# Patient Record
Sex: FEMALE | Race: WHITE | NOT HISPANIC OR LATINO | Employment: FULL TIME | URBAN - METROPOLITAN AREA
[De-identification: names, ages, dates, MRNs, and addresses within clinical notes are randomized per-mention and may not be internally consistent; named-entity substitution may affect disease eponyms.]

---

## 2019-02-10 ENCOUNTER — HOSPITAL ENCOUNTER (INPATIENT)
Facility: HOSPITAL | Age: 24
LOS: 1 days | Discharge: HOME | End: 2019-02-12
Attending: EMERGENCY MEDICINE | Admitting: HOSPITALIST

## 2019-02-10 DIAGNOSIS — R19.7 NAUSEA VOMITING AND DIARRHEA: Primary | ICD-10-CM

## 2019-02-10 DIAGNOSIS — E86.0 DEHYDRATION: ICD-10-CM

## 2019-02-10 DIAGNOSIS — R11.2 NAUSEA VOMITING AND DIARRHEA: Primary | ICD-10-CM

## 2019-02-10 DIAGNOSIS — D72.829 LEUKOCYTOSIS, UNSPECIFIED TYPE: ICD-10-CM

## 2019-02-10 DIAGNOSIS — E87.20 LACTIC ACIDOSIS: ICD-10-CM

## 2019-02-10 LAB
BASOPHILS # BLD AUTO: 0.02 10*3/MM3 (ref 0–0.2)
BASOPHILS NFR BLD AUTO: 0.1 % (ref 0–1.5)
D-LACTATE SERPL-SCNC: 4.4 MMOL/L (ref 0.5–2)
DEPRECATED RDW RBC AUTO: 40.3 FL (ref 37–54)
EOSINOPHIL # BLD AUTO: 0.01 10*3/MM3 (ref 0–0.7)
EOSINOPHIL NFR BLD AUTO: 0.1 % (ref 0.3–6.2)
ERYTHROCYTE [DISTWIDTH] IN BLOOD BY AUTOMATED COUNT: 12.5 % (ref 11.7–13)
HCG SERPL QL: NEGATIVE
HCT VFR BLD AUTO: 45.6 % (ref 35.6–45.5)
HGB BLD-MCNC: 15.6 G/DL (ref 11.9–15.5)
IMM GRANULOCYTES # BLD AUTO: 0.08 10*3/MM3 (ref 0–0.03)
IMM GRANULOCYTES NFR BLD AUTO: 0.4 % (ref 0–0.5)
LYMPHOCYTES # BLD AUTO: 1.44 10*3/MM3 (ref 0.9–4.8)
LYMPHOCYTES NFR BLD AUTO: 8 % (ref 19.6–45.3)
MAGNESIUM SERPL-MCNC: 2 MG/DL (ref 1.6–2.6)
MCH RBC QN AUTO: 30.8 PG (ref 26.9–32)
MCHC RBC AUTO-ENTMCNC: 34.2 G/DL (ref 32.4–36.3)
MCV RBC AUTO: 90.1 FL (ref 80.5–98.2)
MONOCYTES # BLD AUTO: 0.17 10*3/MM3 (ref 0.2–1.2)
MONOCYTES NFR BLD AUTO: 0.9 % (ref 5–12)
NEUTROPHILS # BLD AUTO: 16.37 10*3/MM3 (ref 1.9–8.1)
NEUTROPHILS NFR BLD AUTO: 90.5 % (ref 42.7–76)
PLATELET # BLD AUTO: 302 10*3/MM3 (ref 140–500)
PMV BLD AUTO: 10 FL (ref 6–12)
RBC # BLD AUTO: 5.06 10*6/MM3 (ref 3.9–5.2)
WBC NRBC COR # BLD: 18.09 10*3/MM3 (ref 4.5–10.7)

## 2019-02-10 PROCEDURE — 84703 CHORIONIC GONADOTROPIN ASSAY: CPT | Performed by: EMERGENCY MEDICINE

## 2019-02-10 PROCEDURE — 83735 ASSAY OF MAGNESIUM: CPT | Performed by: EMERGENCY MEDICINE

## 2019-02-10 PROCEDURE — 25010000002 ONDANSETRON PER 1 MG: Performed by: EMERGENCY MEDICINE

## 2019-02-10 PROCEDURE — 25010000002 HYDROMORPHONE PER 4 MG: Performed by: EMERGENCY MEDICINE

## 2019-02-10 PROCEDURE — 36415 COLL VENOUS BLD VENIPUNCTURE: CPT

## 2019-02-10 PROCEDURE — 84145 PROCALCITONIN (PCT): CPT | Performed by: EMERGENCY MEDICINE

## 2019-02-10 PROCEDURE — 99285 EMERGENCY DEPT VISIT HI MDM: CPT

## 2019-02-10 PROCEDURE — 85025 COMPLETE CBC W/AUTO DIFF WBC: CPT | Performed by: EMERGENCY MEDICINE

## 2019-02-10 PROCEDURE — 80053 COMPREHEN METABOLIC PANEL: CPT | Performed by: EMERGENCY MEDICINE

## 2019-02-10 PROCEDURE — 83605 ASSAY OF LACTIC ACID: CPT | Performed by: EMERGENCY MEDICINE

## 2019-02-10 RX ORDER — SODIUM CHLORIDE 9 MG/ML
125 INJECTION, SOLUTION INTRAVENOUS CONTINUOUS
Status: DISCONTINUED | OUTPATIENT
Start: 2019-02-10 | End: 2019-02-11

## 2019-02-10 RX ORDER — HYDROMORPHONE HYDROCHLORIDE 1 MG/ML
0.5 INJECTION, SOLUTION INTRAMUSCULAR; INTRAVENOUS; SUBCUTANEOUS ONCE
Status: COMPLETED | OUTPATIENT
Start: 2019-02-10 | End: 2019-02-10

## 2019-02-10 RX ORDER — ONDANSETRON 2 MG/ML
4 INJECTION INTRAMUSCULAR; INTRAVENOUS ONCE
Status: COMPLETED | OUTPATIENT
Start: 2019-02-10 | End: 2019-02-10

## 2019-02-10 RX ADMIN — SODIUM CHLORIDE 1775 ML: 9 INJECTION, SOLUTION INTRAVENOUS at 22:24

## 2019-02-10 RX ADMIN — SODIUM CHLORIDE 1000 ML: 9 INJECTION, SOLUTION INTRAVENOUS at 21:39

## 2019-02-10 RX ADMIN — HYDROMORPHONE HYDROCHLORIDE 0.5 MG: 1 INJECTION, SOLUTION INTRAMUSCULAR; INTRAVENOUS; SUBCUTANEOUS at 21:50

## 2019-02-10 RX ADMIN — ONDANSETRON 4 MG: 2 INJECTION INTRAMUSCULAR; INTRAVENOUS at 21:39

## 2019-02-11 ENCOUNTER — APPOINTMENT (OUTPATIENT)
Dept: CT IMAGING | Facility: HOSPITAL | Age: 24
End: 2019-02-11

## 2019-02-11 PROBLEM — K52.9 GASTROENTERITIS: Status: ACTIVE | Noted: 2019-02-11

## 2019-02-11 PROBLEM — R11.2 NAUSEA VOMITING AND DIARRHEA: Status: ACTIVE | Noted: 2019-02-11

## 2019-02-11 PROBLEM — R19.7 NAUSEA VOMITING AND DIARRHEA: Status: ACTIVE | Noted: 2019-02-11

## 2019-02-11 PROBLEM — E87.29 HIGH ANION GAP METABOLIC ACIDOSIS: Status: ACTIVE | Noted: 2019-02-11

## 2019-02-11 PROBLEM — D72.829 LEUKOCYTOSIS: Status: ACTIVE | Noted: 2019-02-11

## 2019-02-11 PROBLEM — E87.20 LACTIC ACIDOSIS: Status: ACTIVE | Noted: 2019-02-11

## 2019-02-11 LAB
ADV 40+41 DNA STL QL NAA+NON-PROBE: NOT DETECTED
ALBUMIN SERPL-MCNC: 4.6 G/DL (ref 3.5–5.2)
ALBUMIN/GLOB SERPL: 1.1 G/DL
ALP SERPL-CCNC: 102 U/L (ref 39–117)
ALT SERPL W P-5'-P-CCNC: 14 U/L (ref 1–33)
ANION GAP SERPL CALCULATED.3IONS-SCNC: 15.6 MMOL/L
ANION GAP SERPL CALCULATED.3IONS-SCNC: 31.3 MMOL/L
AST SERPL-CCNC: 17 U/L (ref 1–32)
ASTRO TYP 1-8 RNA STL QL NAA+NON-PROBE: NOT DETECTED
BACTERIA UR QL AUTO: NORMAL /HPF
BASOPHILS # BLD AUTO: 0.01 10*3/MM3 (ref 0–0.2)
BASOPHILS NFR BLD AUTO: 0.1 % (ref 0–1.5)
BILIRUB SERPL-MCNC: 0.7 MG/DL (ref 0.1–1.2)
BILIRUB UR QL STRIP: NEGATIVE
BUN BLD-MCNC: 11 MG/DL (ref 6–20)
BUN BLD-MCNC: 17 MG/DL (ref 6–20)
BUN/CREAT SERPL: 19.3 (ref 7–25)
BUN/CREAT SERPL: 23.6 (ref 7–25)
C CAYETANENSIS DNA STL QL NAA+NON-PROBE: NOT DETECTED
CALCIUM SPEC-SCNC: 10.2 MG/DL (ref 8.6–10.5)
CALCIUM SPEC-SCNC: 8.2 MG/DL (ref 8.6–10.5)
CAMPY SP DNA.DIARRHEA STL QL NAA+PROBE: NOT DETECTED
CHLORIDE SERPL-SCNC: 102 MMOL/L (ref 98–107)
CHLORIDE SERPL-SCNC: 105 MMOL/L (ref 98–107)
CLARITY UR: CLEAR
CO2 SERPL-SCNC: 19.4 MMOL/L (ref 22–29)
CO2 SERPL-SCNC: 8.7 MMOL/L (ref 22–29)
COLOR UR: YELLOW
CREAT BLD-MCNC: 0.57 MG/DL (ref 0.57–1)
CREAT BLD-MCNC: 0.72 MG/DL (ref 0.57–1)
CRYPTOSP STL CULT: NOT DETECTED
D-LACTATE SERPL-SCNC: 1.6 MMOL/L (ref 0.5–2)
DEPRECATED RDW RBC AUTO: 40.2 FL (ref 37–54)
E COLI DNA SPEC QL NAA+PROBE: NOT DETECTED
E HISTOLYT AG STL-ACNC: NOT DETECTED
EAEC PAA PLAS AGGR+AATA ST NAA+NON-PRB: NOT DETECTED
EC STX1 + STX2 GENES STL NAA+PROBE: NOT DETECTED
EOSINOPHIL # BLD AUTO: 0 10*3/MM3 (ref 0–0.7)
EOSINOPHIL NFR BLD AUTO: 0 % (ref 0.3–6.2)
EPEC EAE GENE STL QL NAA+NON-PROBE: NOT DETECTED
ERYTHROCYTE [DISTWIDTH] IN BLOOD BY AUTOMATED COUNT: 12.6 % (ref 11.7–13)
ETEC LTA+ST1A+ST1B TOX ST NAA+NON-PROBE: NOT DETECTED
G LAMBLIA DNA SPEC QL NAA+PROBE: NOT DETECTED
GFR SERPL CREATININE-BSD FRML MDRD: 100 ML/MIN/1.73
GFR SERPL CREATININE-BSD FRML MDRD: 131 ML/MIN/1.73
GLOBULIN UR ELPH-MCNC: 4.1 GM/DL
GLUCOSE BLD-MCNC: 121 MG/DL (ref 65–99)
GLUCOSE BLD-MCNC: 140 MG/DL (ref 65–99)
GLUCOSE UR STRIP-MCNC: NEGATIVE MG/DL
HCT VFR BLD AUTO: 37.3 % (ref 35.6–45.5)
HGB BLD-MCNC: 12.7 G/DL (ref 11.9–15.5)
HGB UR QL STRIP.AUTO: ABNORMAL
HOLD SPECIMEN: NORMAL
HYALINE CASTS UR QL AUTO: NORMAL /LPF
IMM GRANULOCYTES # BLD AUTO: 0.03 10*3/MM3 (ref 0–0.03)
IMM GRANULOCYTES NFR BLD AUTO: 0.2 % (ref 0–0.5)
KETONES UR QL STRIP: ABNORMAL
LEUKOCYTE ESTERASE UR QL STRIP.AUTO: NEGATIVE
LYMPHOCYTES # BLD AUTO: 0.75 10*3/MM3 (ref 0.9–4.8)
LYMPHOCYTES NFR BLD AUTO: 5.8 % (ref 19.6–45.3)
MCH RBC QN AUTO: 29.6 PG (ref 26.9–32)
MCHC RBC AUTO-ENTMCNC: 34 G/DL (ref 32.4–36.3)
MCV RBC AUTO: 86.9 FL (ref 80.5–98.2)
MONOCYTES # BLD AUTO: 0.86 10*3/MM3 (ref 0.2–1.2)
MONOCYTES NFR BLD AUTO: 6.7 % (ref 5–12)
NEUTROPHILS # BLD AUTO: 11.21 10*3/MM3 (ref 1.9–8.1)
NEUTROPHILS NFR BLD AUTO: 87.4 % (ref 42.7–76)
NITRITE UR QL STRIP: NEGATIVE
NOROVIRUS GI+II RNA STL QL NAA+NON-PROBE: DETECTED
P SHIGELLOIDES DNA STL QL NAA+NON-PROBE: NOT DETECTED
PH UR STRIP.AUTO: <=5 [PH] (ref 5–8)
PLATELET # BLD AUTO: 276 10*3/MM3 (ref 140–500)
PMV BLD AUTO: 9.8 FL (ref 6–12)
POTASSIUM BLD-SCNC: 3.6 MMOL/L (ref 3.5–5.2)
POTASSIUM BLD-SCNC: 3.9 MMOL/L (ref 3.5–5.2)
PROCALCITONIN SERPL-MCNC: 0.15 NG/ML (ref 0.1–0.25)
PROT SERPL-MCNC: 8.7 G/DL (ref 6–8.5)
PROT UR QL STRIP: NEGATIVE
RBC # BLD AUTO: 4.29 10*6/MM3 (ref 3.9–5.2)
RBC # UR: NORMAL /HPF
REF LAB TEST METHOD: NORMAL
RV RNA STL NAA+PROBE: NOT DETECTED
SALMONELLA DNA SPEC QL NAA+PROBE: NOT DETECTED
SAPO I+II+IV+V RNA STL QL NAA+NON-PROBE: NOT DETECTED
SHIGELLA SP+EIEC IPAH STL QL NAA+PROBE: NOT DETECTED
SODIUM BLD-SCNC: 140 MMOL/L (ref 136–145)
SODIUM BLD-SCNC: 142 MMOL/L (ref 136–145)
SP GR UR STRIP: >=1.03 (ref 1–1.03)
SQUAMOUS #/AREA URNS HPF: NORMAL /HPF
UROBILINOGEN UR QL STRIP: ABNORMAL
V CHOLERAE DNA SPEC QL NAA+PROBE: NOT DETECTED
VIBRIO DNA SPEC NAA+PROBE: NOT DETECTED
WBC NRBC COR # BLD: 12.83 10*3/MM3 (ref 4.5–10.7)
WBC UR QL AUTO: NORMAL /HPF
YERSINIA STL CULT: NOT DETECTED

## 2019-02-11 PROCEDURE — 36415 COLL VENOUS BLD VENIPUNCTURE: CPT | Performed by: INTERNAL MEDICINE

## 2019-02-11 PROCEDURE — 83605 ASSAY OF LACTIC ACID: CPT | Performed by: EMERGENCY MEDICINE

## 2019-02-11 PROCEDURE — 85025 COMPLETE CBC W/AUTO DIFF WBC: CPT | Performed by: INTERNAL MEDICINE

## 2019-02-11 PROCEDURE — 25010000002 ONDANSETRON PER 1 MG: Performed by: EMERGENCY MEDICINE

## 2019-02-11 PROCEDURE — 74177 CT ABD & PELVIS W/CONTRAST: CPT

## 2019-02-11 PROCEDURE — 87507 IADNA-DNA/RNA PROBE TQ 12-25: CPT | Performed by: INTERNAL MEDICINE

## 2019-02-11 PROCEDURE — 80048 BASIC METABOLIC PNL TOTAL CA: CPT | Performed by: INTERNAL MEDICINE

## 2019-02-11 PROCEDURE — 25010000002 IOPAMIDOL 61 % SOLUTION: Performed by: EMERGENCY MEDICINE

## 2019-02-11 PROCEDURE — 81001 URINALYSIS AUTO W/SCOPE: CPT | Performed by: EMERGENCY MEDICINE

## 2019-02-11 PROCEDURE — 25810000003 SODIUM CHLORIDE 0.9 % WITH KCL 20 MEQ 20-0.9 MEQ/L-% SOLUTION: Performed by: INTERNAL MEDICINE

## 2019-02-11 RX ORDER — ONDANSETRON 4 MG/1
4 TABLET, FILM COATED ORAL EVERY 6 HOURS PRN
Status: DISCONTINUED | OUTPATIENT
Start: 2019-02-11 | End: 2019-02-12 | Stop reason: HOSPADM

## 2019-02-11 RX ORDER — ACETAMINOPHEN 325 MG/1
650 TABLET ORAL EVERY 4 HOURS PRN
Status: DISCONTINUED | OUTPATIENT
Start: 2019-02-11 | End: 2019-02-12 | Stop reason: HOSPADM

## 2019-02-11 RX ORDER — SODIUM CHLORIDE AND POTASSIUM CHLORIDE 150; 900 MG/100ML; MG/100ML
125 INJECTION, SOLUTION INTRAVENOUS CONTINUOUS
Status: DISCONTINUED | OUTPATIENT
Start: 2019-02-11 | End: 2019-02-12 | Stop reason: HOSPADM

## 2019-02-11 RX ORDER — CHOLECALCIFEROL (VITAMIN D3) 125 MCG
5 CAPSULE ORAL NIGHTLY PRN
Status: DISCONTINUED | OUTPATIENT
Start: 2019-02-11 | End: 2019-02-12 | Stop reason: HOSPADM

## 2019-02-11 RX ORDER — HYDROXYZINE PAMOATE 25 MG/1
25 CAPSULE ORAL 2 TIMES DAILY PRN
COMMUNITY

## 2019-02-11 RX ORDER — SERTRALINE HYDROCHLORIDE 100 MG/1
100 TABLET, FILM COATED ORAL DAILY
Status: DISCONTINUED | OUTPATIENT
Start: 2019-02-11 | End: 2019-02-12 | Stop reason: HOSPADM

## 2019-02-11 RX ORDER — NITROGLYCERIN 0.4 MG/1
0.4 TABLET SUBLINGUAL
Status: DISCONTINUED | OUTPATIENT
Start: 2019-02-11 | End: 2019-02-12 | Stop reason: HOSPADM

## 2019-02-11 RX ORDER — CALCIUM CARBONATE 200(500)MG
2 TABLET,CHEWABLE ORAL 2 TIMES DAILY PRN
Status: DISCONTINUED | OUTPATIENT
Start: 2019-02-11 | End: 2019-02-12 | Stop reason: HOSPADM

## 2019-02-11 RX ORDER — ONDANSETRON 2 MG/ML
4 INJECTION INTRAMUSCULAR; INTRAVENOUS ONCE
Status: COMPLETED | OUTPATIENT
Start: 2019-02-11 | End: 2019-02-11

## 2019-02-11 RX ORDER — ONDANSETRON 2 MG/ML
4 INJECTION INTRAMUSCULAR; INTRAVENOUS EVERY 6 HOURS PRN
Status: DISCONTINUED | OUTPATIENT
Start: 2019-02-11 | End: 2019-02-12 | Stop reason: HOSPADM

## 2019-02-11 RX ORDER — HYDROXYZINE PAMOATE 25 MG/1
25 CAPSULE ORAL 2 TIMES DAILY PRN
Status: DISCONTINUED | OUTPATIENT
Start: 2019-02-11 | End: 2019-02-12 | Stop reason: HOSPADM

## 2019-02-11 RX ORDER — SODIUM CHLORIDE 0.9 % (FLUSH) 0.9 %
3 SYRINGE (ML) INJECTION EVERY 12 HOURS SCHEDULED
Status: DISCONTINUED | OUTPATIENT
Start: 2019-02-11 | End: 2019-02-12 | Stop reason: HOSPADM

## 2019-02-11 RX ORDER — SODIUM CHLORIDE 0.9 % (FLUSH) 0.9 %
3-10 SYRINGE (ML) INJECTION AS NEEDED
Status: DISCONTINUED | OUTPATIENT
Start: 2019-02-11 | End: 2019-02-12 | Stop reason: HOSPADM

## 2019-02-11 RX ORDER — ONDANSETRON 4 MG/1
4 TABLET, ORALLY DISINTEGRATING ORAL EVERY 6 HOURS PRN
Status: DISCONTINUED | OUTPATIENT
Start: 2019-02-11 | End: 2019-02-12 | Stop reason: HOSPADM

## 2019-02-11 RX ORDER — SERTRALINE HYDROCHLORIDE 100 MG/1
100 TABLET, FILM COATED ORAL DAILY
COMMUNITY

## 2019-02-11 RX ADMIN — SERTRALINE 100 MG: 100 TABLET, FILM COATED ORAL at 08:12

## 2019-02-11 RX ADMIN — Medication 3 ML: at 22:05

## 2019-02-11 RX ADMIN — IOPAMIDOL 85 ML: 612 INJECTION, SOLUTION INTRAVENOUS at 00:20

## 2019-02-11 RX ADMIN — Medication 3 ML: at 08:17

## 2019-02-11 RX ADMIN — ACETAMINOPHEN 650 MG: 325 TABLET, FILM COATED ORAL at 16:35

## 2019-02-11 RX ADMIN — POTASSIUM CHLORIDE AND SODIUM CHLORIDE 125 ML/HR: 900; 150 INJECTION, SOLUTION INTRAVENOUS at 06:23

## 2019-02-11 RX ADMIN — POTASSIUM CHLORIDE AND SODIUM CHLORIDE 125 ML/HR: 900; 150 INJECTION, SOLUTION INTRAVENOUS at 14:26

## 2019-02-11 RX ADMIN — POTASSIUM CHLORIDE AND SODIUM CHLORIDE 125 ML/HR: 900; 150 INJECTION, SOLUTION INTRAVENOUS at 22:05

## 2019-02-11 RX ADMIN — ONDANSETRON 4 MG: 2 INJECTION INTRAMUSCULAR; INTRAVENOUS at 01:00

## 2019-02-11 NOTE — H&P
"    Patient Name:  Vika Foley  YOB: 1995  MRN:  [id number]  Admit Date:  2/10/2019  Patient Care Team:  System, Provider Not In as PCP - General      Chief Complaint   Patient presents with   • Vomiting     started this morning   • Diarrhea     Subjective   Ms. Foley is a 23 y.o. smoker otherwise healthy female that presents to [facility] complaining of nausea, vomiting, and diarrhea which started early this morning.  She states that she has had numerous episodes of non-bloody vomiting and 15+ episodes of watery, voluminous, non-bloody diarrhea.  She denies sick contacts, recent travel, antibiotics, and unusual foods.  She was found to be profoundly dehydrated and acidotic in the ER.     History of Present Illness    Past Medical History:   Diagnosis Date   • Anxiety    • Depression    • Panic attacks      Past Surgical History:   Procedure Laterality Date   • TONSILLECTOMY     • WISDOM TOOTH EXTRACTION       History reviewed. No pertinent family history.  Social History     Tobacco Use   • Smoking status: Current Some Day Smoker   • Smokeless tobacco: Never Used   Substance Use Topics   • Alcohol use: Yes     Comment: \"less than a glass\" a week   • Drug use: No     Medications Prior to Admission   Medication Sig Dispense Refill Last Dose   • sertraline (ZOLOFT) 100 MG tablet Take 100 mg by mouth Daily.        Allergies:  No Known Allergies    Review of Systems   Constitutional: Positive for fatigue. Negative for chills and fever.   HENT: Negative for congestion, nosebleeds, sore throat and trouble swallowing.    Eyes: Negative for redness and visual disturbance.   Respiratory: Negative for cough and shortness of breath.    Cardiovascular: Negative for palpitations and leg swelling.   Gastrointestinal: Positive for abdominal pain, diarrhea, nausea and vomiting. Negative for blood in stool and constipation.   Endocrine: Negative for cold intolerance and heat intolerance. "   Genitourinary: Negative for difficulty urinating, dysuria and hematuria.   Musculoskeletal: Negative for arthralgias and myalgias.   Skin: Negative for pallor and rash.   Neurological: Negative for weakness and light-headedness.   Hematological: Negative for adenopathy. Does not bruise/bleed easily.   Psychiatric/Behavioral: Negative for confusion and decreased concentration.        Objective    Vital Signs  Temp:  [97.2 °F (36.2 °C)] 97.2 °F (36.2 °C)  Heart Rate:  [] 85  Resp:  [18-24] 18  BP: (100-136)/(30-93) 125/79  SpO2:  [96 %-100 %] 96 %  on   ;   Device (Oxygen Therapy): room air  Body mass index is 37.31 kg/m².    Physical Exam   Constitutional: She is oriented to person, place, and time. No distress.   HENT:   Head: Normocephalic and atraumatic.   Mouth/Throat: Oropharynx is clear and moist.   Eyes: Conjunctivae and EOM are normal. Pupils are equal, round, and reactive to light.   Neck: Normal range of motion. Neck supple.   Cardiovascular: Normal rate, regular rhythm and intact distal pulses.   Pulmonary/Chest: Effort normal and breath sounds normal.   Abdominal: Soft. Bowel sounds are normal. There is tenderness (mild, diffuse). There is no rebound and no guarding.   Musculoskeletal: She exhibits no edema or tenderness.   Neurological: She is alert and oriented to person, place, and time.   Skin: Skin is warm and dry. Capillary refill takes less than 2 seconds. She is not diaphoretic.   Psychiatric: She has a normal mood and affect. Her behavior is normal.   Nursing note and vitals reviewed.      Results Review:  I reviewed the patient's new clinical results.  I reviewed the patient's new imaging results and agree with the interpretation.  I reviewed the patient's other test results and agree with the interpretation  I personally viewed and interpreted the patient's EKG/Telemetry data  Discussed with ED provider.      Lab Results (last 24 hours)     Procedure Component Value Units Date/Time     CBC & Differential [[id number]] Collected:  02/10/19 2215    Specimen:  Blood Updated:  02/10/19 2228    Narrative:       The following orders were created for panel order CBC & Differential.  Procedure                               Abnormality         Status                     ---------                               -----------         ------                     CBC Auto Differential[[id number]]        Abnormal            Final result                 Please view results for these tests on the individual orders.    Comprehensive Metabolic Panel [[id number]]  (Abnormal) Collected:  02/10/19 2215    Specimen:  Blood Updated:  02/11/19 0001     Glucose 140 mg/dL      BUN 17 mg/dL      Creatinine 0.72 mg/dL      Sodium 142 mmol/L      Potassium 3.6 mmol/L      Chloride 102 mmol/L      CO2 8.7 mmol/L      Calcium 10.2 mg/dL      Total Protein 8.7 g/dL      Albumin 4.60 g/dL      ALT (SGPT) 14 U/L      AST (SGOT) 17 U/L      Alkaline Phosphatase 102 U/L      Total Bilirubin 0.7 mg/dL      eGFR Non African Amer 100 mL/min/1.73      Globulin 4.1 gm/dL      A/G Ratio 1.1 g/dL      BUN/Creatinine Ratio 23.6     Anion Gap 31.3 mmol/L     Magnesium [[id number]]  (Normal) Collected:  02/10/19 2215    Specimen:  Blood Updated:  02/10/19 2359     Magnesium 2.0 mg/dL     hCG, Serum, Qualitative [[id number]]  (Normal) Collected:  02/10/19 2215    Specimen:  Blood Updated:  02/10/19 2246     HCG Qualitative Negative    CBC Auto Differential [[id number]]  (Abnormal) Collected:  02/10/19 2215    Specimen:  Blood Updated:  02/10/19 2228     WBC 18.09 10*3/mm3      RBC 5.06 10*6/mm3      Hemoglobin 15.6 g/dL      Hematocrit 45.6 %      MCV 90.1 fL      MCH 30.8 pg      MCHC 34.2 g/dL      RDW 12.5 %      RDW-SD 40.3 fl      MPV 10.0 fL      Platelets 302 10*3/mm3      Neutrophil % 90.5 %      Lymphocyte % 8.0 %      Monocyte % 0.9 %      Eosinophil % 0.1 %      Basophil % 0.1 %      Immature Grans % 0.4 %      Neutrophils, Absolute 16.37  "10*3/mm3      Lymphocytes, Absolute 1.44 10*3/mm3      Monocytes, Absolute 0.17 10*3/mm3      Eosinophils, Absolute 0.01 10*3/mm3      Basophils, Absolute 0.02 10*3/mm3      Immature Grans, Absolute 0.08 10*3/mm3     Procalcitonin [[id number]]  (Normal) Collected:  02/10/19 2215    Specimen:  Blood Updated:  02/11/19 0006     Procalcitonin 0.15 ng/mL     Narrative:       As a Marker for Sepsis (Non-Neonates):   1. <0.5 ng/mL represents a low risk of severe sepsis and/or septic shock.  1. >2 ng/mL represents a high risk of severe sepsis and/or septic shock.    As a Marker for Lower Respiratory Tract Infections that require antibiotic therapy:  PCT on Admission     Antibiotic Therapy             6-12 Hrs later  > 0.5                Strongly Recommended            >0.25 - <0.5         Recommended  0.1 - 0.25           Discouraged                   Remeasure/reassess PCT  <0.1                 Strongly Discouraged          Remeasure/reassess PCT      As 28 day mortality risk marker: \"Change in Procalcitonin Result\" (> 80 % or <=80 %) if Day 0 (or Day 1) and Day 4 values are available. Refer to http://www.apstratas-pct-calculator.com/   Change in PCT <=80 %   A decrease of PCT levels below or equal to 80 % defines a positive change in PCT test result representing a higher risk for 28-day all-cause mortality of patients diagnosed with severe sepsis or septic shock.  Change in PCT > 80 %   A decrease of PCT levels of more than 80 % defines a negative change in PCT result representing a lower risk for 28-day all-cause mortality of patients diagnosed with severe sepsis or septic shock.                Lactic Acid, Plasma [[id number]]  (Abnormal) Collected:  02/10/19 2259    Specimen:  Blood Updated:  02/10/19 2354     Lactate 4.4 mmol/L     Lactic Acid, Reflex Timer (This will reflex a repeat order 3-3:15 hours after ordered.) [[id number]] Collected:  02/10/19 2259    Specimen:  Blood Updated:  02/11/19 0303     Extra Tube Hold for " add-ons.     Comment: Auto resulted.       Urinalysis With Microscopic If Indicated (No Culture) - Urine, Clean Catch [[id number]]  (Abnormal) Collected:  02/11/19 0050    Specimen:  Urine, Clean Catch Updated:  02/11/19 0106     Color, UA Yellow     Appearance, UA Clear     pH, UA <=5.0     Specific Gravity, UA >=1.030     Glucose, UA Negative     Ketones, UA 80 mg/dL (3+)     Bilirubin, UA Negative     Blood, UA Trace     Protein, UA Negative     Leuk Esterase, UA Negative     Nitrite, UA Negative     Urobilinogen, UA 0.2 E.U./dL    Urinalysis, Microscopic Only - Urine, Clean Catch [[id number]] Collected:  02/11/19 0050    Specimen:  Urine, Clean Catch Updated:  02/11/19 0106     RBC, UA 0-2 /HPF      WBC, UA 0-2 /HPF      Bacteria, UA None Seen /HPF      Squamous Epithelial Cells, UA 0-2 /HPF      Hyaline Casts, UA 0-2 /LPF      Methodology Automated Microscopy    Basic Metabolic Panel [[id number]]  (Abnormal) Collected:  02/11/19 0310    Specimen:  Blood Updated:  02/11/19 0345     Glucose 121 mg/dL      BUN 11 mg/dL      Creatinine 0.57 mg/dL      Sodium 140 mmol/L      Potassium 3.9 mmol/L      Chloride 105 mmol/L      CO2 19.4 mmol/L      Calcium 8.2 mg/dL      eGFR Non African Amer 131 mL/min/1.73      BUN/Creatinine Ratio 19.3     Anion Gap 15.6 mmol/L     Narrative:       GFR Normal >60  Chronic Kidney Disease <60  Kidney Failure <15          Imaging Results (last 24 hours)     Procedure Component Value Units Date/Time    CT Abdomen Pelvis With Contrast [[id number]] Collected:  02/11/19 0038     Updated:  02/11/19 0043    Narrative:       CT SCANS ABDOMEN AND PELVIS WITH IV CONTRAST.     HISTORY: nvd     COMPARISON: None.     TECHNIQUE: Radiation dose reduction techniques were utilized, including  automated exposure control and exposure modulation based on body size.  Axial images were obtained from the lung bases to the symphysis pubis  with IV contrast only.. Oral contrast was not administered per request.      FINDINGS :  On images 51-56, there is a somewhat wedge-shaped area of  diminished enhancement involving the right renal cortex which could be  infectious in nature such as focal pyelonephritis. Urinalysis should be  able to confirm or exclude infectious etiology. Remaining solid organs  have an unremarkable appearance. Normal aorta and appendix. There are a  few fluid filled but nondistended bowel loops. No GI tract inflammation.  The GI tract not opacified for assessment but non obstructive in  appearance.             Impression:       IMPRESSION :   1. Focal area of diminished enhancement of the right kidney for which  urinalysis correlation is suggested to exclude possible pyelonephritis.  Otherwise unremarkable        This report was finalized on 2/11/2019 12:40 AM by Samir Das M.D.             No orders to display     Assessment/Plan   Active Hospital Problems    Diagnosis Date Noted   • Nausea vomiting and diarrhea [R11.2, R19.7] 02/11/2019   • High anion gap metabolic acidosis [E87.2] 02/11/2019   • Lactic acidosis [E87.2] 02/11/2019   • Leukocytosis [D72.829] 02/11/2019   • Gastroenteritis [K52.9] 02/11/2019      Resolved Hospital Problems   No resolved problems to display.   Gastroenteritis  - likely viral  - will check GI PCR  - has profound acidosis but this is improving with IVF-she has received about 3700cc thusfar, and will continue IVF rate with NS/20Meq KCl  - will hold off on antimotility medications until GI PCR results  - abdominal CT scan reviewed and this is benign    Leukocytosis  - probably reactive  - normal procalcitonin is reassuring  - will observe, no indication for antibiotics currently    DVT Prophylaxis  - SCDs    I discussed the patients findings and my recommendations with patient, family and nursing staff.      Samir Coles MD  [facility]  02/11/19  4:19 AM

## 2019-02-11 NOTE — PLAN OF CARE
"Problem: Patient Care Overview  Goal: Plan of Care Review  Outcome: Ongoing (interventions implemented as appropriate)   02/11/19 0457   Coping/Psychosocial   Plan of Care Reviewed With patient   Plan of Care Review   Progress improving   OTHER   Outcome Summary stated feeling better since coming to ER. co pain \"2\" and nausea \"1\". no distress noted, will monitor       Problem: Pain, Acute (Adult)  Goal: Identify Related Risk Factors and Signs and Symptoms  Outcome: Ongoing (interventions implemented as appropriate)   02/11/19 0457   Pain, Acute (Adult)   Related Risk Factors (Acute Pain) other (see comments)  (co pain that started 9am 02/10/2019)   Signs and Symptoms (Acute Pain) verbalization of pain descriptors         "

## 2019-02-11 NOTE — PLAN OF CARE
Problem: Patient Care Overview  Goal: Plan of Care Review  Outcome: Ongoing (interventions implemented as appropriate)   02/11/19 1403   Coping/Psychosocial   Plan of Care Reviewed With patient   Plan of Care Review   Progress improving   OTHER   Outcome Summary patient vss. tolerating clear liquid diet. will advance to full liquid. continue ivf. home soon. will monitor.       Problem: Pain, Acute (Adult)  Goal: Acceptable Pain Control/Comfort Level  Outcome: Ongoing (interventions implemented as appropriate)   02/11/19 1403   Pain, Acute (Adult)   Acceptable Pain Control/Comfort Level making progress toward outcome

## 2019-02-11 NOTE — ED PROVIDER NOTES
" EMERGENCY DEPARTMENT ENCOUNTER    CHIEF COMPLAINT  Chief Complaint: Abdominal Pain   History given by: Patient   History limited by: none   Room Number: E556/1  PMD: System, Provider Not In      HPI:  Pt is a 23 y.o. female who presents complaining of generalized abd pain that began this morning at 0930. Per pt, symptoms began was N/V/D with multiple episodes of \"watery\" diarrhea, no blood. Pt confirms dizziness and anxiety, but denies SOA, chest pain, and fever. Pt states she has had no ill contact and denies any recent travel. Per pt, she has no hx of bowel problems.     Duration:  12 hours   Onset: gradual   Timing: constant   Location: generalized abd   Radiation: none   Quality: pain   Intensity/Severity: mild   Progression: worsening   Associated Symptoms: N/V/D, dizziness, and anxiety   Aggravating Factors: none   Alleviating Factors: none   Previous Episodes: none   Treatment before arrival: none     PAST MEDICAL HISTORY  Active Ambulatory Problems     Diagnosis Date Noted   • No Active Ambulatory Problems     Resolved Ambulatory Problems     Diagnosis Date Noted   • No Resolved Ambulatory Problems     Past Medical History:   Diagnosis Date   • Anxiety    • Depression    • Panic attacks        PAST SURGICAL HISTORY  Past Surgical History:   Procedure Laterality Date   • TONSILLECTOMY     • WISDOM TOOTH EXTRACTION         FAMILY HISTORY  History reviewed. No pertinent family history.    SOCIAL HISTORY  Social History     Socioeconomic History   • Marital status: Single     Spouse name: Not on file   • Number of children: Not on file   • Years of education: Not on file   • Highest education level: Not on file   Social Needs   • Financial resource strain: Not on file   • Food insecurity - worry: Not on file   • Food insecurity - inability: Not on file   • Transportation needs - medical: Not on file   • Transportation needs - non-medical: Not on file   Occupational History   • Not on file   Tobacco Use   • " "Smoking status: Current Some Day Smoker   • Smokeless tobacco: Never Used   Substance and Sexual Activity   • Alcohol use: Yes     Comment: \"less than a glass\" a week   • Drug use: Yes     Types: Marijuana   • Sexual activity: Defer   Other Topics Concern   • Not on file   Social History Narrative   • Not on file       ALLERGIES  Patient has no known allergies.    REVIEW OF SYSTEMS  Review of Systems   Constitutional: Negative for fever.   HENT: Negative for sore throat.    Eyes: Negative.    Respiratory: Negative for cough and shortness of breath.    Cardiovascular: Negative for chest pain.   Gastrointestinal: Positive for abdominal pain (generalized), diarrhea, nausea and vomiting.   Genitourinary: Negative for dysuria.   Musculoskeletal: Negative for neck pain.   Skin: Negative for rash.   Neurological: Positive for dizziness. Negative for weakness, numbness and headaches.   Hematological: Negative.    Psychiatric/Behavioral: The patient is nervous/anxious.    All other systems reviewed and are negative.      PHYSICAL EXAM  ED Triage Vitals [02/10/19 2058]   Temp Heart Rate Resp BP SpO2   97.2 °F (36.2 °C) (!) 131 22 -- 99 %      Temp src Heart Rate Source Patient Position BP Location FiO2 (%)   Tympanic Monitor -- -- --       Physical Exam   Constitutional: She is oriented to person, place, and time. No distress.   HENT:   Head: Normocephalic and atraumatic.   Eyes: EOM are normal. Pupils are equal, round, and reactive to light.   Neck: Normal range of motion. Neck supple.   Cardiovascular: Normal rate, regular rhythm and normal heart sounds.   HR in low 100s   Pulmonary/Chest: Effort normal and breath sounds normal. Tachypnea (mild) noted. No respiratory distress.   O2 sats 100% on room air   Abdominal: Soft. Bowel sounds are normal. There is generalized tenderness. There is no rebound and no guarding.   Musculoskeletal: Normal range of motion. She exhibits no edema.   Neurological: She is alert and oriented to " person, place, and time. She has normal sensation and normal strength.   Skin: Skin is warm and dry. No rash noted.   Psychiatric: Mood and affect normal.   Nursing note and vitals reviewed.      LAB RESULTS  Lab Results (last 24 hours)     ** No results found for the last 24 hours. **          I ordered the above labs and reviewed the results    RADIOLOGY  CT Abdomen Pelvis With Contrast   Final Result   IMPRESSION :    1. Focal area of diminished enhancement of the right kidney for which   urinalysis correlation is suggested to exclude possible pyelonephritis.   Otherwise unremarkable           This report was finalized on 2/11/2019 12:40 AM by Samir Das M.D.               I ordered the above noted radiological studies. Interpreted by radiologist.. Reviewed by me in PACS.     Procedures      PROGRESS AND CONSULTS     2115: Labs ordered for further evaluation. Zofran ordered for nausea management.     2148: Dilaudid ordered for pain management.    2307: Pt rechecked and pain improved following medication, states she is asymptomatic at this time. Discussed with pt the plan for further review of labs and CT prior to making further plan about disposition. Upon re-evaluation, pt's abd is benign.     2310: Care discussed with and transferred to Dr. Hawkins pending results of CT scan, UA.,  and electrolytes       MEDICAL DECISION MAKING  Results were reviewed/discussed with the patient and they were also made aware of online access. Pt also made aware that some labs, such as cultures, will not be resulted during ER visit and follow up with PMD is necessary.     MDM  Number of Diagnoses or Management Options     Amount and/or Complexity of Data Reviewed  Clinical lab tests: ordered and reviewed (WBC - 18.09)  Tests in the radiology section of CPT®: ordered           DIAGNOSIS  Final diagnoses:   Nausea vomiting and diarrhea   Dehydration   Lactic acidosis   Leukocytosis, unspecified type       DISPOSITION  CARE  TRANSFERRED TO DR. PAGAN PENDING RESULTS OF CT SCAN.    Latest Documented Vital Signs:  As of 8:06 AM  BP- 118/73 HR- 64 Temp- 99.8 °F (37.7 °C) (Oral) O2 sat- 95%    --  Documentation assistance provided by rowan Meier for Dr. Garcia.  Information recorded by the scribe was done at my direction and has been verified and validated by me.          Ashlyn Meier  02/10/19 6256       Hitesh Garcia MD  02/13/19 2792

## 2019-02-11 NOTE — PAYOR COMM NOTE
"UR CONTACT:  DAYO  P: [phone]        F: [phone]  ADMITTED 2/10/19 @ 9 PM  WILL SEND MORE CLINICAL AS IT BECOMES AVAILABLE, THANKS      Vika Figueredo (23 y.o. Female)     Date of Birth Social Security Number Address Home Phone MRN    1995  [id number] [facility] [address] [phone] [id number]    Restorationist Marital Status          None Single       Admission Date Admission Type Admitting Provider Attending Provider Department, Room/Bed    2/10/19 Emergency Samir Coles MD Nguyen, Minh Loc, MD [address], E556/1    Discharge Date Discharge Disposition Discharge Destination                       Attending Provider:  Conor Hassan MD    Allergies:  No Known Allergies    Isolation:  None   Infection:  Other (02/11/19)   Code Status:  CPR    Ht:  157.5 cm (62\")   Wt:  92.5 kg (204 lb)    Admission Cmt:  None   Principal Problem:  None                Active Insurance as of 2/10/2019     Primary Coverage     Payor Plan Insurance Group Employer/Plan Group    [employer] [place] [employer] [place]     Payor Plan Address Payor Plan Phone Number Payor Plan Fax Number Effective Dates    PO  [phone]  2/10/2019 - None Entered    [facility] [id number]       Subscriber Name Subscriber Birth Date Member ID       VIKA FIGUEREDO 1995 [id number]                 Emergency Contacts      (Rel.) Home Phone Work Phone Mobile Phone    DARSHAN FIGUEREDO (Father) [phone] -- [phone]               History & Physical      Samir Coles MD at 2/11/2019  4:13 AM              Patient Name:  Vika Figueredo  YOB: 1995  MRN:  [id number]  Admit Date:  2/10/2019  Patient Care Team:  System, Provider Not In as PCP - General      Chief Complaint   Patient presents with   • Vomiting     started this morning   • Diarrhea     Subjective   Ms. Figueredo is a 23 y.o. smoker otherwise healthy female that presents to [facility] " "complaining of nausea, vomiting, and diarrhea which started early this morning.  She states that she has had numerous episodes of non-bloody vomiting and 15+ episodes of watery, voluminous, non-bloody diarrhea.  She denies sick contacts, recent travel, antibiotics, and unusual foods.  She was found to be profoundly dehydrated and acidotic in the ER.     History of Present Illness    Past Medical History:   Diagnosis Date   • Anxiety    • Depression    • Panic attacks      Past Surgical History:   Procedure Laterality Date   • TONSILLECTOMY     • WISDOM TOOTH EXTRACTION       History reviewed. No pertinent family history.  Social History     Tobacco Use   • Smoking status: Current Some Day Smoker   • Smokeless tobacco: Never Used   Substance Use Topics   • Alcohol use: Yes     Comment: \"less than a glass\" a week   • Drug use: No     Medications Prior to Admission   Medication Sig Dispense Refill Last Dose   • sertraline (ZOLOFT) 100 MG tablet Take 100 mg by mouth Daily.        Allergies:  No Known Allergies    Review of Systems   Constitutional: Positive for fatigue. Negative for chills and fever.   HENT: Negative for congestion, nosebleeds, sore throat and trouble swallowing.    Eyes: Negative for redness and visual disturbance.   Respiratory: Negative for cough and shortness of breath.    Cardiovascular: Negative for palpitations and leg swelling.   Gastrointestinal: Positive for abdominal pain, diarrhea, nausea and vomiting. Negative for blood in stool and constipation.   Endocrine: Negative for cold intolerance and heat intolerance.   Genitourinary: Negative for difficulty urinating, dysuria and hematuria.   Musculoskeletal: Negative for arthralgias and myalgias.   Skin: Negative for pallor and rash.   Neurological: Negative for weakness and light-headedness.   Hematological: Negative for adenopathy. Does not bruise/bleed easily.   Psychiatric/Behavioral: Negative for confusion and decreased concentration.    "     Objective    Vital Signs  Temp:  [97.2 °F (36.2 °C)] 97.2 °F (36.2 °C)  Heart Rate:  [] 85  Resp:  [18-24] 18  BP: (100-136)/(30-93) 125/79  SpO2:  [96 %-100 %] 96 %  on   ;   Device (Oxygen Therapy): room air  Body mass index is 37.31 kg/m².    Physical Exam   Constitutional: She is oriented to person, place, and time. No distress.   HENT:   Head: Normocephalic and atraumatic.   Mouth/Throat: Oropharynx is clear and moist.   Eyes: Conjunctivae and EOM are normal. Pupils are equal, round, and reactive to light.   Neck: Normal range of motion. Neck supple.   Cardiovascular: Normal rate, regular rhythm and intact distal pulses.   Pulmonary/Chest: Effort normal and breath sounds normal.   Abdominal: Soft. Bowel sounds are normal. There is tenderness (mild, diffuse). There is no rebound and no guarding.   Musculoskeletal: She exhibits no edema or tenderness.   Neurological: She is alert and oriented to person, place, and time.   Skin: Skin is warm and dry. Capillary refill takes less than 2 seconds. She is not diaphoretic.   Psychiatric: She has a normal mood and affect. Her behavior is normal.   Nursing note and vitals reviewed.      Results Review:  I reviewed the patient's new clinical results.  I reviewed the patient's new imaging results and agree with the interpretation.  I reviewed the patient's other test results and agree with the interpretation  I personally viewed and interpreted the patient's EKG/Telemetry data  Discussed with ED provider.      Lab Results (last 24 hours)     Procedure Component Value Units Date/Time    CBC & Differential [[id number]] Collected:  02/10/19 2215    Specimen:  Blood Updated:  02/10/19 2228    Narrative:       The following orders were created for panel order CBC & Differential.  Procedure                               Abnormality         Status                     ---------                               -----------         ------                     CBC Auto  Differential[[id number]]        Abnormal            Final result                 Please view results for these tests on the individual orders.    Comprehensive Metabolic Panel [[id number]]  (Abnormal) Collected:  02/10/19 2215    Specimen:  Blood Updated:  02/11/19 0001     Glucose 140 mg/dL      BUN 17 mg/dL      Creatinine 0.72 mg/dL      Sodium 142 mmol/L      Potassium 3.6 mmol/L      Chloride 102 mmol/L      CO2 8.7 mmol/L      Calcium 10.2 mg/dL      Total Protein 8.7 g/dL      Albumin 4.60 g/dL      ALT (SGPT) 14 U/L      AST (SGOT) 17 U/L      Alkaline Phosphatase 102 U/L      Total Bilirubin 0.7 mg/dL      eGFR Non African Amer 100 mL/min/1.73      Globulin 4.1 gm/dL      A/G Ratio 1.1 g/dL      BUN/Creatinine Ratio 23.6     Anion Gap 31.3 mmol/L     Magnesium [[id number]]  (Normal) Collected:  02/10/19 2215    Specimen:  Blood Updated:  02/10/19 2359     Magnesium 2.0 mg/dL     hCG, Serum, Qualitative [[id number]]  (Normal) Collected:  02/10/19 2215    Specimen:  Blood Updated:  02/10/19 2246     HCG Qualitative Negative    CBC Auto Differential [[id number]]  (Abnormal) Collected:  02/10/19 2215    Specimen:  Blood Updated:  02/10/19 2228     WBC 18.09 10*3/mm3      RBC 5.06 10*6/mm3      Hemoglobin 15.6 g/dL      Hematocrit 45.6 %      MCV 90.1 fL      MCH 30.8 pg      MCHC 34.2 g/dL      RDW 12.5 %      RDW-SD 40.3 fl      MPV 10.0 fL      Platelets 302 10*3/mm3      Neutrophil % 90.5 %      Lymphocyte % 8.0 %      Monocyte % 0.9 %      Eosinophil % 0.1 %      Basophil % 0.1 %      Immature Grans % 0.4 %      Neutrophils, Absolute 16.37 10*3/mm3      Lymphocytes, Absolute 1.44 10*3/mm3      Monocytes, Absolute 0.17 10*3/mm3      Eosinophils, Absolute 0.01 10*3/mm3      Basophils, Absolute 0.02 10*3/mm3      Immature Grans, Absolute 0.08 10*3/mm3     Procalcitonin [[id number]]  (Normal) Collected:  02/10/19 2215    Specimen:  Blood Updated:  02/11/19 0006     Procalcitonin 0.15 ng/mL     Narrative:       As a  "Marker for Sepsis (Non-Neonates):   1. <0.5 ng/mL represents a low risk of severe sepsis and/or septic shock.  1. >2 ng/mL represents a high risk of severe sepsis and/or septic shock.    As a Marker for Lower Respiratory Tract Infections that require antibiotic therapy:  PCT on Admission     Antibiotic Therapy             6-12 Hrs later  > 0.5                Strongly Recommended            >0.25 - <0.5         Recommended  0.1 - 0.25           Discouraged                   Remeasure/reassess PCT  <0.1                 Strongly Discouraged          Remeasure/reassess PCT      As 28 day mortality risk marker: \"Change in Procalcitonin Result\" (> 80 % or <=80 %) if Day 0 (or Day 1) and Day 4 values are available. Refer to http://www.[employer] [facility]ct-calculator.com/   Change in PCT <=80 %   A decrease of PCT levels below or equal to 80 % defines a positive change in PCT test result representing a higher risk for 28-day all-cause mortality of patients diagnosed with severe sepsis or septic shock.  Change in PCT > 80 %   A decrease of PCT levels of more than 80 % defines a negative change in PCT result representing a lower risk for 28-day all-cause mortality of patients diagnosed with severe sepsis or septic shock.                Lactic Acid, Plasma [[id number]]  (Abnormal) Collected:  02/10/19 2259    Specimen:  Blood Updated:  02/10/19 2354     Lactate 4.4 mmol/L     Lactic Acid, Reflex Timer (This will reflex a repeat order 3-3:15 hours after ordered.) [[id number]] Collected:  02/10/19 2259    Specimen:  Blood Updated:  02/11/19 0303     Extra Tube Hold for add-ons.     Comment: Auto resulted.       Urinalysis With Microscopic If Indicated (No Culture) - Urine, Clean Catch [[id number]]  (Abnormal) Collected:  02/11/19 0050    Specimen:  Urine, Clean Catch Updated:  02/11/19 0106     Color, UA Yellow     Appearance, UA Clear     pH, UA <=5.0     Specific Gravity, UA >=1.030     Glucose, UA Negative     Ketones, UA 80 mg/dL (3+)    "  Bilirubin, UA Negative     Blood, UA Trace     Protein, UA Negative     Leuk Esterase, UA Negative     Nitrite, UA Negative     Urobilinogen, UA 0.2 E.U./dL    Urinalysis, Microscopic Only - Urine, Clean Catch [[id number]] Collected:  02/11/19 0050    Specimen:  Urine, Clean Catch Updated:  02/11/19 0106     RBC, UA 0-2 /HPF      WBC, UA 0-2 /HPF      Bacteria, UA None Seen /HPF      Squamous Epithelial Cells, UA 0-2 /HPF      Hyaline Casts, UA 0-2 /LPF      Methodology Automated Microscopy    Basic Metabolic Panel [[id number]]  (Abnormal) Collected:  02/11/19 0310    Specimen:  Blood Updated:  02/11/19 0345     Glucose 121 mg/dL      BUN 11 mg/dL      Creatinine 0.57 mg/dL      Sodium 140 mmol/L      Potassium 3.9 mmol/L      Chloride 105 mmol/L      CO2 19.4 mmol/L      Calcium 8.2 mg/dL      eGFR Non African Amer 131 mL/min/1.73      BUN/Creatinine Ratio 19.3     Anion Gap 15.6 mmol/L     Narrative:       GFR Normal >60  Chronic Kidney Disease <60  Kidney Failure <15          Imaging Results (last 24 hours)     Procedure Component Value Units Date/Time    CT Abdomen Pelvis With Contrast [[id number]] Collected:  02/11/19 0038     Updated:  02/11/19 0043    Narrative:       CT SCANS ABDOMEN AND PELVIS WITH IV CONTRAST.     HISTORY: nvd     COMPARISON: None.     TECHNIQUE: Radiation dose reduction techniques were utilized, including  automated exposure control and exposure modulation based on body size.  Axial images were obtained from the lung bases to the symphysis pubis  with IV contrast only.. Oral contrast was not administered per request.     FINDINGS :  On images 51-56, there is a somewhat wedge-shaped area of  diminished enhancement involving the right renal cortex which could be  infectious in nature such as focal pyelonephritis. Urinalysis should be  able to confirm or exclude infectious etiology. Remaining solid organs  have an unremarkable appearance. Normal aorta and appendix. There are a  few fluid  filled but nondistended bowel loops. No GI tract inflammation.  The GI tract not opacified for assessment but non obstructive in  appearance.             Impression:       IMPRESSION :   1. Focal area of diminished enhancement of the right kidney for which  urinalysis correlation is suggested to exclude possible pyelonephritis.  Otherwise unremarkable        This report was finalized on 2/11/2019 12:40 AM by Samir Das M.D.             No orders to display     Assessment/Plan   Active Hospital Problems    Diagnosis Date Noted   • Nausea vomiting and diarrhea [R11.2, R19.7] 02/11/2019   • High anion gap metabolic acidosis [E87.2] 02/11/2019   • Lactic acidosis [E87.2] 02/11/2019   • Leukocytosis [D72.829] 02/11/2019   • Gastroenteritis [K52.9] 02/11/2019      Resolved Hospital Problems   No resolved problems to display.   Gastroenteritis  - likely viral  - will check GI PCR  - has profound acidosis but this is improving with IVF-she has received about 3700cc thusfar, and will continue IVF rate with NS/20Meq KCl  - will hold off on antimotility medications until GI PCR results  - abdominal CT scan reviewed and this is benign    Leukocytosis  - probably reactive  - normal procalcitonin is reassuring  - will observe, no indication for antibiotics currently    DVT Prophylaxis  - SCDs    I discussed the patients findings and my recommendations with patient, family and nursing staff.      Samir Coles MD  [facility]  02/11/19  4:19 AM    Electronically signed by Samir Coles MD at 2/11/2019  4:21 AM          Emergency Department Notes      Kayla Lynch RN at 2/10/2019  8:59 PM        Pt ambulatory to triage with steady gait, c/o vomiting and diarrhea that started this morning.  Also c/o dizziness and chills.  Pt hyperventilating at triage, instructed to slow breathing.     Electronically signed by Kayla Lynch RN at 2/10/2019  8:59 PM     Yolanda Campos RN at 2/10/2019   "9:30 PM        Pt c/o n/v/d and generalized ABD pain since 0900 this morning. States her stool is \"liquid brown\" and emesis is \"yellow bile.\" States she has not been able to keep any food or water down today. Denies GI hx or surgery. Denies hx of diabetes. States not been around anyone who is sick as far as she knows. Pt appears uncomfortable and is dry-heaving and hyperventilating. Pt instructed to take slow, deep breaths. VSS.     Yolanda Campos, RN  02/10/19 2154      Electronically signed by Yolanda Campos RN at 2/10/2019  9:54 PM     Rashawn Hawkins MD at 2/11/2019 12:56 AM          Pt is a 23 y.o. Female who presents to the ED c/o generalized abd pain that began yesterday morning. Pt admits nausea and diarrhea. Pt has no other complaints at this time.       Labs Reviewed   COMPREHENSIVE METABOLIC PANEL - Abnormal; Notable for the following components:       Result Value    Glucose 140 (*)     CO2 8.7 (*)     Total Protein 8.7 (*)     All other components within normal limits   URINALYSIS W/ MICROSCOPIC IF INDICATED (NO CULTURE) - Abnormal; Notable for the following components:    Ketones, UA 80 mg/dL (3+) (*)     Blood, UA Trace (*)     All other components within normal limits   CBC WITH AUTO DIFFERENTIAL - Abnormal; Notable for the following components:    WBC 18.09 (*)     Hemoglobin 15.6 (*)     Hematocrit 45.6 (*)     Neutrophil % 90.5 (*)     Lymphocyte % 8.0 (*)     Monocyte % 0.9 (*)     Eosinophil % 0.1 (*)     Neutrophils, Absolute 16.37 (*)     Monocytes, Absolute 0.17 (*)     Immature Grans, Absolute 0.08 (*)     All other components within normal limits   LACTIC ACID, PLASMA - Abnormal; Notable for the following components:    Lactate 4.4 (*)     All other components within normal limits   BASIC METABOLIC PANEL - Abnormal; Notable for the following components:    Glucose 121 (*)     CO2 19.4 (*)     Calcium 8.2 (*)     All other components within normal limits    Narrative:     GFR Normal " ">60  Chronic Kidney Disease <60  Kidney Failure <15   MAGNESIUM - Normal   HCG, SERUM, QUALITATIVE - Normal   PROCALCITONIN - Normal    Narrative:     As a Marker for Sepsis (Non-Neonates):   1. <0.5 ng/mL represents a low risk of severe sepsis and/or septic shock.  1. >2 ng/mL represents a high risk of severe sepsis and/or septic shock.    As a Marker for Lower Respiratory Tract Infections that require antibiotic therapy:  PCT on Admission     Antibiotic Therapy             6-12 Hrs later  > 0.5                Strongly Recommended            >0.25 - <0.5         Recommended  0.1 - 0.25           Discouraged                   Remeasure/reassess PCT  <0.1                 Strongly Discouraged          Remeasure/reassess PCT      As 28 day mortality risk marker: \"Change in Procalcitonin Result\" (> 80 % or <=80 %) if Day 0 (or Day 1) and Day 4 values are available. Refer to http://www."[employer]pct-calculator.[employer]/   Change in PCT <=80 %   A decrease of PCT levels below or equal to 80 % defines a positive change in PCT test result representing a higher risk for 28-day all-cause mortality of patients diagnosed with severe sepsis or septic shock.  Change in PCT > 80 %   A decrease of PCT levels of more than 80 % defines a negative change in PCT result representing a lower risk for 28-day all-cause mortality of patients diagnosed with severe sepsis or septic shock.               GASTROINTESTINAL PANEL, PCR   LACTIC ACID REFLEX TIMER   URINALYSIS, MICROSCOPIC ONLY   LACTIC ACID, REFLEX   CBC WITH AUTO DIFFERENTIAL   CBC AND DIFFERENTIAL    Narrative:     The following orders were created for panel order CBC & Differential.  Procedure                               Abnormality         Status                     ---------                               -----------         ------                     CBC Auto Differential[[id number]]        Abnormal            Final result                 Please view results for these tests on the " individual orders.   CBC AND DIFFERENTIAL    Narrative:     The following orders were created for panel order CBC & Differential.  Procedure                               Abnormality         Status                     ---------                               -----------         ------                     CBC Auto Differential[[id number]]                                                         Please view results for these tests on the individual orders.           PROGRESS:    2300  Care assumed from Dr. Garcia.     0055  Ordered 2nd dose of Zofran secondary to nausea.    0109  Rechecked pt. Pt is resting comfortably. Pt reports she is feeling better after receiving the IV fluids. Notified pt of normal CT scan results. Discussed the plan to admit to hospital. Pt understands and agrees with the plan, all questions answered.    0153  Discussed pt case w Dr. Coles ([facility]) who agrees to admit pt.      DISPOSITION:    Final diagnoses:   Nausea vomiting and diarrhea   Dehydration   Lactic acidosis   Leukocytosis, unspecified type         ADMISSION    Discussed treatment plan and reason for admission with pt/family and admitting physician.  Pt/family voiced understanding of the plan for admission for further testing/treatment as needed.       Documentation assistance provided by rowan Peña for Dr. Hawkins. Information recorded by the c[facility]are was done at my direction and has been verified and validated by me.         Lilian Peña  02/11/19 0207       Rashawn Hawkins MD  02/11/19 0544      Electronically signed by Rashawn Hawkins MD at 2/11/2019  5:44 AM       ICU Vital Signs     Row Name 02/11/19 1358 02/11/19 0900 02/11/19 0820 02/11/19 0700 02/11/19 0500       Vitals    Temp  --  98.6 °F (37 °C)  --  --  --    Temp src  --  Oral  --  Oral  --    Pulse  --  83  --  90  --    Heart Rate Source  --  Monitor  --  Monitor  --    Resp  --  18  --  18  --    Resp Rate Source  --  Visual  --  Visual  --    BP  --   118/65  --  118/65  --    Noninvasive MAP (mmHg)  --  79  --  --  --    BP Location  --  Left arm  --  Left arm  --    BP Method  --  Automatic  --  Automatic  --    Patient Position  --  Lying  --  Lying  --       Oxygen Therapy    SpO2  --  95 %  --  96 %  --    Pulse Oximetry Type  --  Intermittent  --  Intermittent  --    Device (Oxygen Therapy)  room air  room air  room air  room air  room air        Lines, Drains & Airways    Active LDAs     Name:   Placement date:   Placement time:   Site:   Days:    Peripheral IV 02/10/19 2134 Left Hand   02/10/19    2134    Hand   less than 1                Hospital Medications (all)       Dose Frequency Start End    acetaminophen (TYLENOL) tablet 650 mg 650 mg Every 4 Hours PRN 2/11/2019     Sig - Route: Take 2 tablets by mouth Every 4 (Four) Hours As Needed for Mild Pain , Headache or Fever. - Oral    calcium carbonate (TUMS) chewable tablet 500 mg (200 mg elemental) 2 tablet 2 Times Daily PRN 2/11/2019     Sig - Route: Chew 1,000 mg 2 (Two) Times a Day As Needed for Indigestion or Heartburn. - Oral    HYDROmorphone (DILAUDID) injection 0.5 mg 0.5 mg Once 2/10/2019 2/10/2019    Sig - Route: Infuse 0.5 mL into a venous catheter 1 (One) Time. - Intravenous    hydrOXYzine pamoate (VISTARIL) capsule 25 mg 25 mg 2 Times Daily PRN 2/11/2019     Sig - Route: Take 1 capsule by mouth 2 (Two) Times a Day As Needed for Anxiety. - Oral    iopamidol (ISOVUE-300) 61 % injection 100 mL 100 mL Once in Imaging 2/11/2019 2/11/2019    Sig - Route: Infuse 100 mL into a venous catheter Once. - Intravenous    melatonin tablet 5 mg 5 mg Nightly PRN 2/11/2019     Sig - Route: Take 1 tablet by mouth At Night As Needed for Sleep. - Oral    nitroglycerin (NITROSTAT) SL tablet 0.4 mg 0.4 mg Every 5 Minutes PRN 2/11/2019     Sig - Route: Place 1 tablet under the tongue Every 5 (Five) Minutes As Needed for Chest Pain (Chest Pain With Systolic Blood Pressure Greater Than 100). - Sublingual     "ondansetron (ZOFRAN) injection 4 mg 4 mg Once 2/10/2019 2/10/2019    Sig - Route: Infuse 2 mL into a venous catheter 1 (One) Time. - Intravenous    ondansetron (ZOFRAN) injection 4 mg 4 mg Once 2/11/2019 2/11/2019    Sig - Route: Infuse 2 mL into a venous catheter 1 (One) Time. - Intravenous    ondansetron (ZOFRAN) injection 4 mg 4 mg Every 6 Hours PRN 2/11/2019     Sig - Route: Infuse 2 mL into a venous catheter Every 6 (Six) Hours As Needed for Nausea or Vomiting. - Intravenous    Linked Group 1:  \"Or\" Linked Group Details        ondansetron (ZOFRAN) tablet 4 mg 4 mg Every 6 Hours PRN 2/11/2019     Sig - Route: Take 1 tablet by mouth Every 6 (Six) Hours As Needed for Nausea or Vomiting. - Oral    Linked Group 1:  \"Or\" Linked Group Details        ondansetron ODT (ZOFRAN-ODT) disintegrating tablet 4 mg 4 mg Every 6 Hours PRN 2/11/2019     Sig - Route: Take 1 tablet by mouth Every 6 (Six) Hours As Needed for Nausea or Vomiting. - Oral    Linked Group 1:  \"Or\" Linked Group Details        sertraline (ZOLOFT) tablet 100 mg 100 mg Daily 2/11/2019     Sig - Route: Take 1 tablet by mouth Daily. - Oral    sodium chloride 0.9 % bolus 1,000 mL 1,000 mL Once 2/10/2019 2/10/2019    Sig - Route: Infuse 1,000 mL into a venous catheter 1 (One) Time. - Intravenous    sodium chloride 0.9 % bolus 2,775 mL 30 mL/kg × 92.5 kg Once 2/10/2019 2/11/2019    Sig - Route: Infuse 2,775 mL into a venous catheter 1 (One) Time. - Intravenous    sodium chloride 0.9 % flush 3 mL 3 mL Every 12 Hours Scheduled 2/11/2019     Sig - Route: Infuse 3 mL into a venous catheter Every 12 (Twelve) Hours. - Intravenous    sodium chloride 0.9 % flush 3-10 mL 3-10 mL As Needed 2/11/2019     Sig - Route: Infuse 3-10 mL into a venous catheter As Needed for Line Care. - Intravenous    sodium chloride 0.9 % with KCl 20 mEq/L infusion 125 mL/hr Continuous 2/11/2019     Sig - Route: Infuse 125 mL/hr into a venous catheter Continuous. - Intravenous    sodium chloride " 0.9 % infusion (Discontinued) 125 mL/hr Continuous 2/10/2019 2/11/2019    Sig - Route: Infuse 125 mL/hr into a venous catheter Continuous. - Intravenous

## 2019-02-11 NOTE — ED TRIAGE NOTES
Pt ambulatory to triage with steady gait, c/o vomiting and diarrhea that started this morning.  Also c/o dizziness and chills.  Pt hyperventilating at triage, instructed to slow breathing.

## 2019-02-11 NOTE — ED NOTES
"Pt c/o n/v/d and generalized ABD pain since 0900 this morning. States her stool is \"liquid brown\" and emesis is \"yellow bile.\" States she has not been able to keep any food or water down today. Denies GI hx or surgery. Denies hx of diabetes. States not been around anyone who is sick as far as she knows. Pt appears uncomfortable and is dry-heaving and hyperventilating. Pt instructed to take slow, deep breaths. Yolanda Richards, RN  02/10/19 8614    "

## 2019-02-11 NOTE — ED PROVIDER NOTES
Pt is a 23 y.o. Female who presents to the ED c/o generalized abd pain that began yesterday morning. Pt admits nausea and diarrhea. Pt has no other complaints at this time.       Labs Reviewed   COMPREHENSIVE METABOLIC PANEL - Abnormal; Notable for the following components:       Result Value    Glucose 140 (*)     CO2 8.7 (*)     Total Protein 8.7 (*)     All other components within normal limits   URINALYSIS W/ MICROSCOPIC IF INDICATED (NO CULTURE) - Abnormal; Notable for the following components:    Ketones, UA 80 mg/dL (3+) (*)     Blood, UA Trace (*)     All other components within normal limits   CBC WITH AUTO DIFFERENTIAL - Abnormal; Notable for the following components:    WBC 18.09 (*)     Hemoglobin 15.6 (*)     Hematocrit 45.6 (*)     Neutrophil % 90.5 (*)     Lymphocyte % 8.0 (*)     Monocyte % 0.9 (*)     Eosinophil % 0.1 (*)     Neutrophils, Absolute 16.37 (*)     Monocytes, Absolute 0.17 (*)     Immature Grans, Absolute 0.08 (*)     All other components within normal limits   LACTIC ACID, PLASMA - Abnormal; Notable for the following components:    Lactate 4.4 (*)     All other components within normal limits   BASIC METABOLIC PANEL - Abnormal; Notable for the following components:    Glucose 121 (*)     CO2 19.4 (*)     Calcium 8.2 (*)     All other components within normal limits    Narrative:     GFR Normal >60  Chronic Kidney Disease <60  Kidney Failure <15   MAGNESIUM - Normal   HCG, SERUM, QUALITATIVE - Normal   PROCALCITONIN - Normal    Narrative:     As a Marker for Sepsis (Non-Neonates):   1. <0.5 ng/mL represents a low risk of severe sepsis and/or septic shock.  1. >2 ng/mL represents a high risk of severe sepsis and/or septic shock.    As a Marker for Lower Respiratory Tract Infections that require antibiotic therapy:  PCT on Admission     Antibiotic Therapy             6-12 Hrs later  > 0.5                Strongly Recommended            >0.25 - <0.5         Recommended  0.1 - 0.25            "Discouraged                   Remeasure/reassess PCT  <0.1                 Strongly Discouraged          Remeasure/reassess PCT      As 28 day mortality risk marker: \"Change in Procalcitonin Result\" (> 80 % or <=80 %) if Day 0 (or Day 1) and Day 4 values are available. Refer to http://www.[facility] Management GroupMcC[facility] – IdabelDeskActivepct-calculator.com/   Change in PCT <=80 %   A decrease of PCT levels below or equal to 80 % defines a positive change in PCT test result representing a higher risk for 28-day all-cause mortality of patients diagnosed with severe sepsis or septic shock.  Change in PCT > 80 %   A decrease of PCT levels of more than 80 % defines a negative change in PCT result representing a lower risk for 28-day all-cause mortality of patients diagnosed with severe sepsis or septic shock.               GASTROINTESTINAL PANEL, PCR   LACTIC ACID REFLEX TIMER   URINALYSIS, MICROSCOPIC ONLY   LACTIC ACID, REFLEX   CBC WITH AUTO DIFFERENTIAL   CBC AND DIFFERENTIAL    Narrative:     The following orders were created for panel order CBC & Differential.  Procedure                               Abnormality         Status                     ---------                               -----------         ------                     CBC Auto Differential[[id number]]        Abnormal            Final result                 Please view results for these tests on the individual orders.   CBC AND DIFFERENTIAL    Narrative:     The following orders were created for panel order CBC & Differential.  Procedure                               Abnormality         Status                     ---------                               -----------         ------                     CBC Auto Differential[[id number]]                                                         Please view results for these tests on the individual orders.           PROGRESS:    2300  Care assumed from Dr. Garcia.     0055  Ordered 2nd dose of Zofran secondary to nausea.    0109  Rechecked pt. Pt is " resting comfortably. Pt reports she is feeling better after receiving the IV fluids. Notified pt of normal CT scan results. Discussed the plan to admit to hospital. Pt understands and agrees with the plan, all questions answered.    0153  Discussed pt case w Dr. Coles ([facility]) who agrees to admit pt.      DISPOSITION:    Final diagnoses:   Nausea vomiting and diarrhea   Dehydration   Lactic acidosis   Leukocytosis, unspecified type         ADMISSION    Discussed treatment plan and reason for admission with pt/family and admitting physician.  Pt/family voiced understanding of the plan for admission for further testing/treatment as needed.       Documentation assistance provided by rowan Peña for Dr. Hawkins. Information recorded by the scrdm was done at my direction and has been verified and validated by me.         Lilian Peña  02/11/19 0206       Rashawn Hawkins MD  02/11/19 0671

## 2019-02-12 VITALS
RESPIRATION RATE: 18 BRPM | HEIGHT: 62 IN | BODY MASS INDEX: 37.54 KG/M2 | SYSTOLIC BLOOD PRESSURE: 118 MMHG | OXYGEN SATURATION: 95 % | HEART RATE: 64 BPM | DIASTOLIC BLOOD PRESSURE: 73 MMHG | WEIGHT: 204 LBS | TEMPERATURE: 99.8 F

## 2019-02-12 PROBLEM — R11.2 NAUSEA VOMITING AND DIARRHEA: Status: RESOLVED | Noted: 2019-02-11 | Resolved: 2019-02-12

## 2019-02-12 PROBLEM — R19.7 NAUSEA VOMITING AND DIARRHEA: Status: RESOLVED | Noted: 2019-02-11 | Resolved: 2019-02-12

## 2019-02-12 PROBLEM — E87.20 LACTIC ACIDOSIS: Status: RESOLVED | Noted: 2019-02-11 | Resolved: 2019-02-12

## 2019-02-12 PROBLEM — D72.829 LEUKOCYTOSIS: Status: RESOLVED | Noted: 2019-02-11 | Resolved: 2019-02-12

## 2019-02-12 PROBLEM — A08.11 NOROVIRUS: Status: ACTIVE | Noted: 2019-02-12

## 2019-02-12 PROBLEM — E87.29 HIGH ANION GAP METABOLIC ACIDOSIS: Status: RESOLVED | Noted: 2019-02-11 | Resolved: 2019-02-12

## 2019-02-12 LAB
ANION GAP SERPL CALCULATED.3IONS-SCNC: 12.5 MMOL/L
BASOPHILS # BLD AUTO: 0.01 10*3/MM3 (ref 0–0.2)
BASOPHILS NFR BLD AUTO: 0.1 % (ref 0–1.5)
BUN BLD-MCNC: 4 MG/DL (ref 6–20)
BUN/CREAT SERPL: 8.7 (ref 7–25)
CALCIUM SPEC-SCNC: 8.4 MG/DL (ref 8.6–10.5)
CHLORIDE SERPL-SCNC: 106 MMOL/L (ref 98–107)
CO2 SERPL-SCNC: 21.5 MMOL/L (ref 22–29)
CREAT BLD-MCNC: 0.46 MG/DL (ref 0.57–1)
DEPRECATED RDW RBC AUTO: 39.5 FL (ref 37–54)
EOSINOPHIL # BLD AUTO: 0.1 10*3/MM3 (ref 0–0.4)
EOSINOPHIL NFR BLD AUTO: 1.5 % (ref 0.3–6.2)
ERYTHROCYTE [DISTWIDTH] IN BLOOD BY AUTOMATED COUNT: 12.3 % (ref 12.3–15.4)
GFR SERPL CREATININE-BSD FRML MDRD: >150 ML/MIN/1.73
GLUCOSE BLD-MCNC: 97 MG/DL (ref 65–99)
HCT VFR BLD AUTO: 35.3 % (ref 34–46.6)
HGB BLD-MCNC: 11.7 G/DL (ref 12–15.9)
IMM GRANULOCYTES # BLD AUTO: 0.02 10*3/MM3 (ref 0–0.05)
IMM GRANULOCYTES NFR BLD AUTO: 0.3 % (ref 0–0.5)
LYMPHOCYTES # BLD AUTO: 2.13 10*3/MM3 (ref 0.7–3.1)
LYMPHOCYTES NFR BLD AUTO: 31.7 % (ref 19.6–45.3)
MCH RBC QN AUTO: 28.9 PG (ref 26.6–33)
MCHC RBC AUTO-ENTMCNC: 33.1 G/DL (ref 31.5–35.7)
MCV RBC AUTO: 87.2 FL (ref 79–97)
MONOCYTES # BLD AUTO: 0.9 10*3/MM3 (ref 0.1–0.9)
MONOCYTES NFR BLD AUTO: 13.4 % (ref 5–12)
NEUTROPHILS # BLD AUTO: 3.55 10*3/MM3 (ref 1.4–7)
NEUTROPHILS NFR BLD AUTO: 53 % (ref 42.7–76)
NRBC BLD AUTO-RTO: 0 /100 WBC (ref 0–0)
PLATELET # BLD AUTO: 216 10*3/MM3 (ref 140–450)
PMV BLD AUTO: 10.3 FL (ref 6–12)
POTASSIUM BLD-SCNC: 3.8 MMOL/L (ref 3.5–5.2)
RBC # BLD AUTO: 4.05 10*6/MM3 (ref 3.77–5.28)
SODIUM BLD-SCNC: 140 MMOL/L (ref 136–145)
WBC NRBC COR # BLD: 6.71 10*3/MM3 (ref 3.4–10.8)

## 2019-02-12 PROCEDURE — 85025 COMPLETE CBC W/AUTO DIFF WBC: CPT | Performed by: INTERNAL MEDICINE

## 2019-02-12 PROCEDURE — 80048 BASIC METABOLIC PNL TOTAL CA: CPT | Performed by: INTERNAL MEDICINE

## 2019-02-12 PROCEDURE — 25810000003 SODIUM CHLORIDE 0.9 % WITH KCL 20 MEQ 20-0.9 MEQ/L-% SOLUTION: Performed by: INTERNAL MEDICINE

## 2019-02-12 RX ADMIN — Medication 3 ML: at 10:02

## 2019-02-12 RX ADMIN — SERTRALINE 100 MG: 100 TABLET, FILM COATED ORAL at 08:30

## 2019-02-12 RX ADMIN — POTASSIUM CHLORIDE AND SODIUM CHLORIDE 125 ML/HR: 900; 150 INJECTION, SOLUTION INTRAVENOUS at 16:07

## 2019-02-12 NOTE — DISCHARGE SUMMARY
[facility]    Date of Discharge:  2/12/2019    PCP: System, Provider Not In    Discharge Diagnosis:   Active Hospital Problems    Diagnosis Date Noted   • **Norovirus [A08.11] 02/12/2019   • Gastroenteritis [K52.9] 02/11/2019      Resolved Hospital Problems    Diagnosis Date Noted Date Resolved   • Nausea vomiting and diarrhea [R11.2, R19.7] 02/11/2019 02/12/2019   • High anion gap metabolic acidosis [E87.2] 02/11/2019 02/12/2019   • Lactic acidosis [E87.2] 02/11/2019 02/12/2019   • Leukocytosis [D72.829] 02/11/2019 02/12/2019     Procedures Performed       Consults     Date and Time Order Name Status Description    2/11/2019 0121 [facility] (on-call MD unless specified) Completed         Hospital Course  Please see history and physical for details. Patient is a 23 y.o. female admitted with nausea vomiting and diarrhea.  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  She was diagnosed with gastroenteritis and a stool PCR was positive for norovirus.  She had reactive leukocytosis that resolved.  She had acidosis and lactate elevation that improved with fluids.  She is tolerating p.o. today and has no further GI symptoms and would like to go home. CT scan suggested possible pyelonephritis however urinalysis showed ketones without bacteriuria and without pyuria and her symptoms resolved with supportive care.  I told her she should not prepare food for others for others until at least 2 days after resolution of symptoms.    I discussed the patient's findings and my recommendations with patient and nursing staff.    Condition on Discharge: Improved.     Temp:  [98.7 °F (37.1 °C)-99.8 °F (37.7 °C)] 99.8 °F (37.7 °C)  Heart Rate:  [] 64  Resp:  [14-18] 18  BP: (104-131)/(64-80) 118/73  Body mass index is 37.31 kg/m².    Physical Exam   Constitutional: She is oriented to person, place, and time. No distress.    Cardiovascular: Normal rate and regular rhythm.   Pulmonary/Chest: Effort normal and breath sounds normal. No respiratory distress.   Abdominal: Soft. Bowel sounds are normal. There is no tenderness. There is no rebound and no guarding.   Musculoskeletal: She exhibits no edema.   Neurological: She is alert and oriented to person, place, and time.   Skin: Skin is warm and dry.   Psychiatric: She has a normal mood and affect. Her behavior is normal.        Discharge Medications      Continue These Medications      Instructions Start Date   hydrOXYzine pamoate 25 MG capsule  Commonly known as:  VISTARIL   25 mg, Oral, 2 Times Daily PRN      sertraline 100 MG tablet  Commonly known as:  ZOLOFT   100 mg, Oral, Daily            Diet Instructions     Diet: Regular      Discharge Diet:  Regular         Activity Instructions     Activity as Tolerated      Do not prepare food for others until at least 2 days after resolution of symptoms         Additional Instructions for the Follow-ups that You Need to Schedule     Call MD for problems / concerns.   As directed        Follow-up Information     System, Provider Not In .    Contact information:  [facility] [address]                  Conor Hassan MD  02/12/19  5:42 PM

## 2019-02-12 NOTE — PLAN OF CARE
Problem: Patient Care Overview  Goal: Plan of Care Review  Outcome: Ongoing (interventions implemented as appropriate)   02/12/19 0529   Coping/Psychosocial   Plan of Care Reviewed With patient   Plan of Care Review   Progress improving   OTHER   Outcome Summary denies pain stated sore, tolerating full liq diet, continues to have loose bm, will monitor       Problem: Pain, Acute (Adult)  Goal: Acceptable Pain Control/Comfort Level  Outcome: Ongoing (interventions implemented as appropriate)   02/12/19 0529   Pain, Acute (Adult)   Acceptable Pain Control/Comfort Level making progress toward outcome

## 2019-02-12 NOTE — PLAN OF CARE
Problem: Patient Care Overview  Goal: Plan of Care Review  Outcome: Ongoing (interventions implemented as appropriate)   02/12/19 1717   Coping/Psychosocial   Plan of Care Reviewed With patient   Plan of Care Review   Progress improving   OTHER   Outcome Summary pt VSS, no complaints of pain, tolerating liquid diet. Eager to be discharged. Will continue to monitor.       Problem: Pain, Acute (Adult)  Goal: Identify Related Risk Factors and Signs and Symptoms  Outcome: Ongoing (interventions implemented as appropriate)   02/12/19 1717   Pain, Acute (Adult)   Related Risk Factors (Acute Pain) persistent pain   Signs and Symptoms (Acute Pain) fatigue/weakness     Goal: Acceptable Pain Control/Comfort Level  Outcome: Ongoing (interventions implemented as appropriate)   02/12/19 1717   Pain, Acute (Adult)   Acceptable Pain Control/Comfort Level making progress toward outcome

## 2019-02-13 NOTE — PAYOR COMM NOTE
"DISCHARGED        Loraine Figueredo (23 y.o. Female)     Date of Birth Social Security Number Address Home Phone MRN    1995  [id number] [phone] [id number]    Judaism Marital Status          None Single       Admission Date Admission Type Admitting Provider Attending Provider Department, Room/Bed    2/10/19 Emergency Samir Coles MD  [address], [address]/1    Discharge Date Discharge Disposition Discharge Destination        2/12/2019 Home or Self Care              Attending Provider:  (none)   Allergies:  No Known Allergies    Isolation:  None   Infection:  Other (02/11/19)   Code Status:  Prior    Ht:  157.5 cm (62\")   Wt:  92.5 kg (204 lb)    Admission Cmt:  None   Principal Problem:  Norovirus [A08.11]                 Active Insurance as of 2/10/2019     Primary Coverage     Payor Plan Insurance Group Employer/Plan Group    HU[employer]NA MEDICAID HU[employer] [id number]     Payor Plan Address Payor Plan Phone Number Payor Plan Fax Number Effective Dates    PO  [phone]  2/10/2019 - None Entered    [facility] [id number]       Subscriber Name Subscriber Birth Date Member ID       LORAINE FIGUEREDO 1995 [id number]                 Emergency Contacts      (Rel.) Home Phone Work Phone Mobile Phone    DARSHAN FIGUEREDO (Father) [phone] -- [phone]              "

## 2019-02-13 NOTE — PROGRESS NOTES
Case Management Discharge Note    Final Note: Pt discharged home, no known needs.  CAITLIN Nation RN    Destination      No service has been selected for the patient.      Durable Medical Equipment      No service has been selected for the patient.      Dialysis/Infusion      No service has been selected for the patient.      Home Medical Care      No service has been selected for the patient.      Community Resources      No service has been selected for the patient.             Final Discharge Disposition Code: 01 - home or self-care

## 2023-09-25 ENCOUNTER — OFFICE VISIT (OUTPATIENT)
Dept: FAMILY MEDICINE CLINIC | Facility: CLINIC | Age: 28
End: 2023-09-25

## 2023-09-25 VITALS
DIASTOLIC BLOOD PRESSURE: 82 MMHG | RESPIRATION RATE: 14 BRPM | BODY MASS INDEX: 34.6 KG/M2 | HEART RATE: 80 BPM | SYSTOLIC BLOOD PRESSURE: 118 MMHG | OXYGEN SATURATION: 99 % | WEIGHT: 188 LBS | HEIGHT: 62 IN

## 2023-09-25 DIAGNOSIS — Z11.59 ENCOUNTER FOR HEPATITIS C SCREENING TEST FOR LOW RISK PATIENT: ICD-10-CM

## 2023-09-25 DIAGNOSIS — F32.A ANXIETY AND DEPRESSION: ICD-10-CM

## 2023-09-25 DIAGNOSIS — Z20.822 EXPOSURE TO COVID-19 VIRUS: ICD-10-CM

## 2023-09-25 DIAGNOSIS — Z00.00 PREVENTATIVE HEALTH CARE: Primary | ICD-10-CM

## 2023-09-25 DIAGNOSIS — F41.9 ANXIETY AND DEPRESSION: ICD-10-CM

## 2023-09-25 DIAGNOSIS — Z86.39 HISTORY OF VITAMIN D DEFICIENCY: ICD-10-CM

## 2023-09-25 PROBLEM — A08.11 NOROVIRUS: Status: RESOLVED | Noted: 2019-02-12 | Resolved: 2023-09-25

## 2023-09-25 PROBLEM — K52.9 GASTROENTERITIS: Status: RESOLVED | Noted: 2019-02-11 | Resolved: 2023-09-25

## 2023-09-25 LAB
EXPIRATION DATE: NORMAL
FLUAV AG UPPER RESP QL IA.RAPID: NOT DETECTED
FLUBV AG UPPER RESP QL IA.RAPID: NOT DETECTED
INTERNAL CONTROL: NORMAL
Lab: NORMAL
SARS-COV-2 AG UPPER RESP QL IA.RAPID: NOT DETECTED

## 2023-09-25 PROCEDURE — 87428 SARSCOV & INF VIR A&B AG IA: CPT | Performed by: NURSE PRACTITIONER

## 2023-09-25 PROCEDURE — 99385 PREV VISIT NEW AGE 18-39: CPT | Performed by: NURSE PRACTITIONER

## 2023-09-25 RX ORDER — LAMOTRIGINE 150 MG/1
TABLET ORAL
COMMUNITY
Start: 2023-09-20

## 2023-09-25 RX ORDER — MULTIVIT-MIN/IRON/FOLIC ACID/K 18-600-40
CAPSULE ORAL
COMMUNITY

## 2023-09-25 NOTE — PATIENT INSTRUCTIONS
Suicidal Feelings: How to Help Yourself  Suicide is when you end your own life. Suicidal ideation includes expressing thoughts about, or a preoccupation with, ending your own life. There are many things you can do to help yourself feel better when struggling with these feelings. Many services and people are available to support you and others who struggle with similar feelings.  If you ever feel like you may hurt yourself or others, or have thoughts about taking your own life, get help right away. To get help:  Go to your nearest emergency department.  Call your local emergency services (121 in the [place]  Call the Critical access hospital and [facility] services helpline (211 in the [place]  Call or text a suicide hotline to speak with a trained counselor. The following suicide hotlines are available in the [place]:  5-541-254-TALK (1-599.455.3131 or 262 in the [place]  1-187-IVBWPGF (1-849.247.9097).  Text 079191. This is the Crisis Text Line in the [place]  1-514.843.2612. This is a hotline for Indonesian speakers.  1-944.876.1287. This is a hotline for TTY users.  4-708-2-U-SKYLER (1-329.404.2517). This is a hotline for lesbian, cabral, bisexual, transgender, or questioning youth.  For a list of hotlines in [place], visit suicide.org/hotlines/international/ucdjma-dbnpyae-gfotyijw.html  Contact a crisis center or a local suicide prevention center. To find a crisis center or suicide prevention center:  Call your local hospital, clinic, community service organization, mental health center, social service provider, or health department. Ask for help with connecting to a crisis center.  For a list of crisis centers in the [place], visit: suicidepreventionlifeline.org  For a list of crisis centers in [place], visit: suicideprevention.ca  How to help yourself feel better    Promise yourself that you will not do anything bad or extreme when you have suicidal feelings. Remember the times you have felt hopeful.  Many people have  gotten through suicidal thoughts and feelings, and you can too.  If you have had these feelings before, remind yourself that you can get through them again.  Let family, friends, teachers, or counselors know how you are feeling. Do not separate yourself from those who care about you and want to help you.  Talk with someone every day, even if you do not feel like talking to anyone or being with other people.  Face-to-face conversation is best to help them understand your feelings.  Contact a mental health care provider and work with this person regularly.  Make a safety plan that you can follow during a crisis.  Include phone numbers of suicide prevention hotlines, mental health professionals, and trusted friends and family members you can call during an emergency.  Save these numbers on your phone.  If you are thinking of taking a lot of medicine, give your medicine to someone who can give it to you as prescribed.  If you are on antidepressants and are concerned you will overdose, tell your health care provider so that he or she can give you safer medicines.  Try to stick to your routines and follow a schedule every day. Make self-care a priority.  Make a list of realistic goals, and cross them off when you achieve them. Accomplishments can give you a sense of worth.  Wait until you are feeling better before doing things that you find difficult or unpleasant.  Do things that you have always enjoyed to take your mind off your feelings.  Try reading a book, or listening to or playing music.  Spending time outside, in nature, may help you feel better.  Follow these instructions at home:    Visit your primary health care provider every year for a physical and a mental health checkup.  Take over-the-counter and prescription medicines only as told by your health care provider.  Ask your health care provider about the possible side effects of any medicines you are taking.  Ask your health care provider about whether  suicidal ideation is a possible side effect of any of your medicines.  Learn about suicidal ideation and what increases the risk for the development of suicidal thoughts.  Eat a well-balanced diet, and eat regular meals.  Get plenty of rest.  Exercise if you are able. Just 30 minutes of exercise each day can help you feel better.  Keep your living space well lit.  Do not use alcohol or drugs. Remove these substances from your home.  General recommendations  Remove weapons, poisons, knives, and other deadly items from your home.  Work with a mental health care provider as needed.  When you are feeling well, write yourself a letter with tips and support that you can read when you are not feeling well.  Remember that life's difficulties can be sorted out with help. Conditions can be treated, and you can learn behaviors and ways of thinking that will help you.  Work with your health care provider or counselor to learn ways of coping with your thoughts and feelings.  Where to find more information  National Suicide Prevention Lifeline: www.suicidepreventionlifeline.org  Hopeline: www.hopeline.[employer]  American Foundation for Suicide Prevention: www.afsp.org  The Vladislav Project (for lesbian, cabral, bisexual, transgender, or questioning youth): www.thetrevorproject.org  National [place] of Mental Health: www.nimh.nih.gov/health/topics/suicide-prevention  Suicide Prevention Resources: afsp.org/suicide-prevention-resources  Contact a health care provider if:  You feel as though you are a burden to others.  You feel agitated, angry, vengeful, or have extreme mood swings.  You have withdrawn from family and friends.  You are frequently using drugs or alcohol.  Get help right away if:  You are talking about suicide or wishing to die.  You start making plans for how to commit suicide.  You feel that you have no reason to live.  You start making plans for putting your affairs in order, saying goodbye, or giving your possessions  away.  You feel guilt, shame, or unbearable pain, and it seems like there is no way out.  You are engaging in risky behaviors that could lead to death.  If you have any of these thoughts or symptoms, get help right away:  Go to your nearest emergency department or crisis center.  Call emergency services (911 in the [place]  Call or text a suicide crisis helpline.  Summary  Suicide is when you take your own life. Suicidal feelings are thoughts about ending your own life.  Promise yourself that you will not do anything bad or extreme when you have suicidal feelings.  Let family, friends, teachers, or counselors know how you are feeling.  Get help right away if you start making plans for how to commit suicide.  This information is not intended to replace advice given to you by your health care provider. Make sure you discuss any questions you have with your health care provider.  Document Revised: 07/14/2022 Document Reviewed: 04/28/2022  Elsevier Patient Education © 2023 Elsevier Inc.

## 2023-09-25 NOTE — PROGRESS NOTES
"Subjective   Vika Foley is a 28 y.o. female who presents for NEW Patient here to establish care. Over due for labs, annual, updated vaccines.     Acute on Chronic anxiety and depression x years, progressively worse since adulthood. Current symptoms include depressed mood, insomnia, and suicidal thoughts without plan. Has history of trauma with past illness/norovirus and 4 days hospitalization. Anxiety/depression Symptoms have been gradually worsening since that time. Patient reports fatigue, insomnia, and suicidal thoughts without plan. Previous treatment includes: individual therapy and medication. She complains of the following side effects from the treatment: decreased libido, trouble losing weight.     Acute exposure to Covid 19. She is not having symptoms and would like testing.     The following portions of the patient's history were reviewed and updated as appropriate: allergies, current medications, past family history, past medical history, past social history, past surgical history, and problem list.    Review of Systems  A comprehensive review of systems was negative except for: Ears, nose, mouth, throat, and face: positive for nasal congestion   Review of Systems - History obtained from the patient  Endocrine ROS: positive for - mood swings  Respiratory ROS: no cough, shortness of breath, or wheezing  Cardiovascular ROS: no chest pain or dyspnea on exertion  Gastrointestinal ROS: no abdominal pain, change in bowel habits, or black or bloody stools  Neurological ROS: no TIA or stroke symptoms, no headaches, distant history of migraine w aura      Objective    /82   Pulse 80   Resp 14   Ht 157.5 cm (62\")   Wt 85.3 kg (188 lb)   SpO2 99%   Breastfeeding No   BMI 34.39 kg/m²    General:  alert, appears stated age, cooperative, and no distress   Affect & Behavior:  good grooming, good insight, and normal thought patterns  pleasant     Physical Exam  Vitals reviewed.   Constitutional:       " Appearance: Normal appearance. She is obese.   HENT:      Head: Normocephalic.      Right Ear: Tympanic membrane normal.      Left Ear: Tympanic membrane normal.      Nose: Nose normal.      Mouth/Throat:      Mouth: Mucous membranes are moist.   Eyes:      Pupils: Pupils are equal, round, and reactive to light.   Cardiovascular:      Rate and Rhythm: Normal rate and regular rhythm.      Pulses: Normal pulses.      Heart sounds: Normal heart sounds.   Pulmonary:      Effort: Pulmonary effort is normal.      Breath sounds: Normal breath sounds.   Abdominal:      General: Bowel sounds are normal.      Palpations: Abdomen is soft.   Musculoskeletal:         General: Normal range of motion.      Cervical back: Normal range of motion.   Skin:     General: Skin is warm.   Neurological:      General: No focal deficit present.      Mental Status: She is alert.   Psychiatric:         Mood and Affect: Mood is anxious and depressed.        Assessment & Plan    Anxiety /Depression, cw meds, exercise, therapy, walking. Cw meds       Medications: lamictal.  Labs: CBC, TSH, and CMP, lipids, A1c .  Follow up: 3 month.    Covid is negative , take allergy meds as needed    Preventative care- Follow heart healthy diet, drink water, walk daily. Wear seatbelts, wear helmets, wear sunscreens. Follow CDC guidelines for covid vaccines.

## 2023-09-26 LAB
25(OH)D3+25(OH)D2 SERPL-MCNC: 31.2 NG/ML (ref 30–100)
ALBUMIN SERPL-MCNC: 4.8 G/DL (ref 4–5)
ALBUMIN/GLOB SERPL: 1.8 {RATIO} (ref 1.2–2.2)
ALP SERPL-CCNC: 97 IU/L (ref 44–121)
ALT SERPL-CCNC: 20 IU/L (ref 0–32)
AST SERPL-CCNC: 19 IU/L (ref 0–40)
BASOPHILS # BLD AUTO: 0 X10E3/UL (ref 0–0.2)
BASOPHILS NFR BLD AUTO: 1 %
BILIRUB SERPL-MCNC: 0.9 MG/DL (ref 0–1.2)
BUN SERPL-MCNC: 10 MG/DL (ref 6–20)
BUN/CREAT SERPL: 14 (ref 9–23)
CALCIUM SERPL-MCNC: 9.6 MG/DL (ref 8.7–10.2)
CHLORIDE SERPL-SCNC: 101 MMOL/L (ref 96–106)
CHOLEST SERPL-MCNC: 139 MG/DL (ref 100–199)
CHOLEST/HDLC SERPL: 3 RATIO (ref 0–4.4)
CO2 SERPL-SCNC: 21 MMOL/L (ref 20–29)
CREAT SERPL-MCNC: 0.72 MG/DL (ref 0.57–1)
EGFRCR SERPLBLD CKD-EPI 2021: 117 ML/MIN/1.73
EOSINOPHIL # BLD AUTO: 0.1 X10E3/UL (ref 0–0.4)
EOSINOPHIL NFR BLD AUTO: 3 %
ERYTHROCYTE [DISTWIDTH] IN BLOOD BY AUTOMATED COUNT: 12 % (ref 11.7–15.4)
GLOBULIN SER CALC-MCNC: 2.6 G/DL (ref 1.5–4.5)
GLUCOSE SERPL-MCNC: 84 MG/DL (ref 70–99)
HCT VFR BLD AUTO: 42.7 % (ref 34–46.6)
HCV IGG SERPL QL IA: NON REACTIVE
HDLC SERPL-MCNC: 47 MG/DL
HGB BLD-MCNC: 14.3 G/DL (ref 11.1–15.9)
IMM GRANULOCYTES # BLD AUTO: 0 X10E3/UL (ref 0–0.1)
IMM GRANULOCYTES NFR BLD AUTO: 0 %
LDLC SERPL CALC-MCNC: 77 MG/DL (ref 0–99)
LYMPHOCYTES # BLD AUTO: 1 X10E3/UL (ref 0.7–3.1)
LYMPHOCYTES NFR BLD AUTO: 30 %
MCH RBC QN AUTO: 29.7 PG (ref 26.6–33)
MCHC RBC AUTO-ENTMCNC: 33.5 G/DL (ref 31.5–35.7)
MCV RBC AUTO: 89 FL (ref 79–97)
MONOCYTES # BLD AUTO: 0.4 X10E3/UL (ref 0.1–0.9)
MONOCYTES NFR BLD AUTO: 12 %
NEUTROPHILS # BLD AUTO: 1.7 X10E3/UL (ref 1.4–7)
NEUTROPHILS NFR BLD AUTO: 54 %
PLATELET # BLD AUTO: 233 X10E3/UL (ref 150–450)
POTASSIUM SERPL-SCNC: 4.6 MMOL/L (ref 3.5–5.2)
PROT SERPL-MCNC: 7.4 G/DL (ref 6–8.5)
RBC # BLD AUTO: 4.81 X10E6/UL (ref 3.77–5.28)
SODIUM SERPL-SCNC: 138 MMOL/L (ref 134–144)
T4 FREE SERPL-MCNC: 1.35 NG/DL (ref 0.82–1.77)
THYROPEROXIDASE AB SERPL-ACNC: 10 IU/ML (ref 0–34)
TRIGL SERPL-MCNC: 74 MG/DL (ref 0–149)
TSH SERPL DL<=0.005 MIU/L-ACNC: 1.51 UIU/ML (ref 0.45–4.5)
VLDLC SERPL CALC-MCNC: 15 MG/DL (ref 5–40)
WBC # BLD AUTO: 3.2 X10E3/UL (ref 3.4–10.8)

## 2023-11-15 ENCOUNTER — TELEPHONE (OUTPATIENT)
Dept: OBSTETRICS AND GYNECOLOGY | Age: 28
End: 2023-11-15
Payer: COMMERCIAL

## 2023-11-15 NOTE — TELEPHONE ENCOUNTER
Provider: [facility] AVAILABLE PROVIDER-NEW GYN    Caller: LORAINE FIGUEREDO    Relationship to Patient: SELF    Phone Number: [phone] CALL ANYTIME, IT IS OKAY TO LVM.    Reason for Call: PATIENT IS SCHEDULED FOR A NEW GYN APPT ON 01/10/24. PATIENT HAS LOST WEIGHT AND STATES FEELS LIKE NEXPLANON IS MOVING AROUND WHEN EXERCISING. PATIENT IS REQUESTING A SOONER APPT AND WANTS REMOVAL ONLY AS SOON AS POSSIBLE DUE TO DISCOMFORT.    HUB UNABLE TO FIND SOONER APPT. UNABLE TO W/T.

## 2023-12-18 ENCOUNTER — OFFICE VISIT (OUTPATIENT)
Dept: FAMILY MEDICINE CLINIC | Facility: CLINIC | Age: 28
End: 2023-12-18
Payer: COMMERCIAL

## 2023-12-18 VITALS
WEIGHT: 170 LBS | HEART RATE: 99 BPM | RESPIRATION RATE: 18 BRPM | HEIGHT: 62 IN | SYSTOLIC BLOOD PRESSURE: 102 MMHG | DIASTOLIC BLOOD PRESSURE: 70 MMHG | BODY MASS INDEX: 31.28 KG/M2 | OXYGEN SATURATION: 100 %

## 2023-12-18 DIAGNOSIS — F41.9 ANXIETY AND DEPRESSION: Primary | ICD-10-CM

## 2023-12-18 DIAGNOSIS — R53.82 CHRONIC FATIGUE: ICD-10-CM

## 2023-12-18 DIAGNOSIS — E55.9 VITAMIN D DEFICIENCY: ICD-10-CM

## 2023-12-18 DIAGNOSIS — F32.A ANXIETY AND DEPRESSION: Primary | ICD-10-CM

## 2023-12-18 PROCEDURE — 99213 OFFICE O/P EST LOW 20 MIN: CPT | Performed by: NURSE PRACTITIONER

## 2023-12-18 RX ORDER — OLANZAPINE 5 MG/1
TABLET ORAL
COMMUNITY
Start: 2023-11-13

## 2023-12-18 RX ORDER — ARIPIPRAZOLE 5 MG/1
TABLET ORAL
COMMUNITY

## 2023-12-18 NOTE — PROGRESS NOTES
Subjective   Vika Foley is a 28 y.o. female.     Anxiety      Her past medical history is significant for depression.   Depression   Estab pt here for follow up    Acute on chronic anxiety and depression x years. Started as teenager. She has been seeing [facility] and is wanting to see out Psych APRN. She has had several psychiatric dx in the past, never hospitalized.  Dx ADHD 2+ years ago. Never been on ADHD meds. She has difficulty with self care. She is working at [employer]. She is managed on olanzapine 5 mg and abilify 5 mg and lamotrigine 150 mg.  She reports on occasion if she turns head too fast she sees things in periphery, denies auditory hallucinations, only every peripheral. She is unsure if these are truly hallucinations. Dad is main support person. She is staying with dad at this time.   She is working on diet but does not eat a lot of fresh foods.     History of Vit D def, is on 7000IU D3 daily. This helps w mood and energy.    The following portions of the patient's history were reviewed and updated as appropriate: allergies, current medications, past family history, past medical history, past social history, past surgical history and problem list.    Review of Systems    Objective   Physical Exam  Vitals reviewed.   Constitutional:       Appearance: Normal appearance. She is obese.   HENT:      Mouth/Throat:      Mouth: Mucous membranes are moist.   Cardiovascular:      Rate and Rhythm: Normal rate and regular rhythm.      Pulses: Normal pulses.      Heart sounds: Normal heart sounds.   Pulmonary:      Effort: Pulmonary effort is normal.      Breath sounds: Normal breath sounds.   Abdominal:      General: Bowel sounds are normal.      Palpations: Abdomen is soft.   Skin:     General: Skin is warm.   Neurological:      General: No focal deficit present.      Mental Status: She is alert.   Psychiatric:         Mood and Affect: Mood is anxious and depressed. Affect is not flat.         Thought Content:  Thought content does not include homicidal or suicidal plan.         Vitals:    12/18/23 1350   BP: 102/70   Pulse: 99   Resp: 18   SpO2: 100%     Body mass index is 31.09 kg/m².    Procedures    Assessment & Plan   Problems Addressed this Visit    None  Visit Diagnoses       Anxiety and depression    -  Primary    Relevant Medications    OLANZapine (zyPREXA) 5 MG tablet    Abilify 5 MG tablet    Other Relevant Orders    Ambulatory Referral to Behavioral Health    Chronic fatigue        Relevant Orders    Ambulatory Referral to Behavioral Health    Vitamin D deficiency              Diagnoses         Codes Comments    Anxiety and depression    -  Primary ICD-10-CM: F41.9, F32.A  ICD-9-CM: 300.00, 311     Chronic fatigue     ICD-10-CM: R53.82  ICD-9-CM: 780.79     Vitamin D deficiency     ICD-10-CM: E55.9  ICD-9-CM: 268.9           Anxiety/depression- refer [facility]  [facility]w [facility] and meds, therapy  Discussed SI and safety plan. Call [phone] or go to ER for acute crisis.     Fatigue- cw vit D and sunlight, eat healthy diet         Education provided in AVS   No follow-ups on file.

## 2024-01-10 ENCOUNTER — OFFICE VISIT (OUTPATIENT)
Dept: OBSTETRICS AND GYNECOLOGY | Age: 29
End: 2024-01-10
Payer: COMMERCIAL

## 2024-01-10 VITALS
HEIGHT: 62 IN | WEIGHT: 170.8 LBS | BODY MASS INDEX: 31.43 KG/M2 | DIASTOLIC BLOOD PRESSURE: 54 MMHG | SYSTOLIC BLOOD PRESSURE: 98 MMHG

## 2024-01-10 DIAGNOSIS — Z01.419 ENCOUNTER FOR ANNUAL ROUTINE GYNECOLOGICAL EXAMINATION: Primary | ICD-10-CM

## 2024-01-10 DIAGNOSIS — Z30.09 OTHER GENERAL COUNSELING AND ADVICE FOR CONTRACEPTIVE MANAGEMENT: ICD-10-CM

## 2024-01-10 RX ORDER — ALPRAZOLAM 0.25 MG/1
TABLET ORAL
COMMUNITY
Start: 2023-06-12
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
## 2024-01-10 NOTE — PROGRESS NOTES
"[facility]   Obstetrics and Gynecology     1/10/2024    Patient: Vika Foley          MR#:[id number]    History of Present Illness    Chief Complaint   Patient presents with    Gynecologic Exam     C/o : patient coming in to [facility] care   Annual Patient stated \" no issues \" consult about Nexplanon removal          28 y.o. female No obstetric history on file. Who presents for annual exam and to discuss Nexplanon placement. Her last pap was over three years ago.     Relevant data reviewed:    Patient's last menstrual period was 01/01/2024.  Obstetric History:  OB History    No obstetric history on file.        Menstrual History:     Patient's last menstrual period was 01/01/2024.       Social History     Substance and Sexual Activity   Sexual Activity Not Currently    Birth control/protection: Nexplanon     ______________________________________  Patient Active Problem List   Diagnosis   (none) - all problems resolved or deleted     Past Medical History:   Diagnosis Date    Anxiety     Depression     Migraine teenage years    Panic attacks      Past Surgical History:   Procedure Laterality Date    TONSILLECTOMY      WISDOM TOOTH EXTRACTION       Social History     Tobacco Use   Smoking Status Never   Smokeless Tobacco Never     Family History   Problem Relation Age of Onset    Diabetes Mother         pre-diabetic    Thyroid disease Father         thyroid \"issue\" per patient    Diabetes Maternal Grandfather     Alzheimer's disease Paternal Grandfather      Prior to Admission medications    Medication Sig Start Date End Date Taking? Authorizing Provider   Abilify 5 MG tablet     Maria G Celestin MD   Etonogestrel (NEXPLANON SC) Inject  under the skin into the appropriate area as directed.    Maria G Celestin MD   lamoTRIgine (LaMICtal) 150 MG tablet  9/20/23   Maria G Celestin MD   OLANZapine (zyPREXA) 5 MG tablet  11/13/23   Maria G Celestin MD   Vitamin D, Cholecalciferol, 50 MCG " "(2000 UT) capsule Take  by mouth.    Provider, MD Maria G     _______________________________________    Current contraception: Nexplanon  History of abnormal Pap smear: no  Family history of uterine or ovarian cancer: no  Family History of colon cancer/colon polyps: no    The following portions of the patient's history were reviewed and updated as appropriate: allergies, current medications, past family history, past medical history, past social history, past surgical history, and problem list.      Pertinent items are noted in HPI.       Objective   Physical Exam  Vitals and nursing note reviewed.   Constitutional:       Appearance: Normal appearance.   HENT:      Head: Normocephalic and atraumatic.   Pulmonary:      Effort: Pulmonary effort is normal.   Chest:   Breasts:     Right: Normal.      Left: Normal.   Abdominal:      General: Abdomen is flat.      Palpations: Abdomen is soft.   Lymphadenopathy:      Upper Body:      Right upper body: No supraclavicular, axillary or pectoral adenopathy.      Left upper body: No supraclavicular, axillary or pectoral adenopathy.   Skin:     General: Skin is warm and dry.   Neurological:      Mental Status: She is alert and oriented to person, place, and time.   Psychiatric:         Mood and Affect: Mood normal.         BP 98/54 (BP Location: Right arm, Patient Position: Sitting, Cuff Size: Adult)   Ht 157.5 cm (62\")   Wt 77.5 kg (170 lb 12.8 oz)   LMP 01/01/2024   BMI 31.24 kg/m²    BP Readings from Last 3 Encounters:   01/10/24 98/54   12/18/23 102/70   09/25/23 118/82      Wt Readings from Last 3 Encounters:   01/10/24 77.5 kg (170 lb 12.8 oz)   12/18/23 77.1 kg (170 lb)   09/25/23 85.3 kg (188 lb)        BMI: Estimated body mass index is 31.24 kg/m² as calculated from the following:    Height as of this encounter: 157.5 cm (62\").    Weight as of this encounter: 77.5 kg (170 lb 12.8 oz).    As part of wellness and prevention, the following topics were discussed " with the patient:  Sexual transmitted disease prevention   Contraception discussed.   Dental health discussed  Mental health discussed.   Vaccinations/immunizations addressed.           Problem List   Meds  History  Prep for Surg   Imagin}    Assessment:  28 y.o. female No obstetric history on file. who presents for annual exam.  Diagnoses and all orders for this visit:    1. Encounter for annual routine gynecological examination (Primary)    2. Other general counseling and advice for contraceptive management  Comments:  - discussed various methods of contraception.   - Patient would like Nexplanon removed and Mirena placed > ordered today    - Pap will plan for pap at IUD visit.   - Mammo/Colonoscopy not indicated  - Vaccine recs reviewed  - STI screening declined      Plan:  No follow-ups on file.    Mary Gong MD  1/10/2024 08:58 EST

## 2024-01-11 ENCOUNTER — PATIENT ROUNDING (BHMG ONLY) (OUTPATIENT)
Dept: OBSTETRICS AND GYNECOLOGY | Age: 29
End: 2024-01-11
Payer: COMMERCIAL

## 2024-01-11 ENCOUNTER — OFFICE VISIT (OUTPATIENT)
Dept: BEHAVIORAL HEALTH | Facility: CLINIC | Age: 29
End: 2024-01-11
Payer: COMMERCIAL

## 2024-01-11 VITALS
SYSTOLIC BLOOD PRESSURE: 102 MMHG | RESPIRATION RATE: 16 BRPM | WEIGHT: 170.2 LBS | HEIGHT: 62 IN | DIASTOLIC BLOOD PRESSURE: 62 MMHG | HEART RATE: 88 BPM | BODY MASS INDEX: 31.32 KG/M2 | OXYGEN SATURATION: 98 %

## 2024-01-11 DIAGNOSIS — F42.2 MIXED OBSESSIONAL THOUGHTS AND ACTS: ICD-10-CM

## 2024-01-11 DIAGNOSIS — F90.0 ADHD (ATTENTION DEFICIT HYPERACTIVITY DISORDER), INATTENTIVE TYPE: ICD-10-CM

## 2024-01-11 DIAGNOSIS — F41.0 GENERALIZED ANXIETY DISORDER WITH PANIC ATTACKS: ICD-10-CM

## 2024-01-11 DIAGNOSIS — F33.1 MDD (MAJOR DEPRESSIVE DISORDER), RECURRENT EPISODE, MODERATE: Primary | ICD-10-CM

## 2024-01-11 DIAGNOSIS — F41.1 GENERALIZED ANXIETY DISORDER WITH PANIC ATTACKS: ICD-10-CM

## 2024-01-11 DIAGNOSIS — R45.851 SUICIDAL IDEATION: ICD-10-CM

## 2024-01-11 RX ORDER — ARIPIPRAZOLE 5 MG/1
5 TABLET ORAL DAILY
Qty: 30 TABLET | Refills: 0 | Status: SHIPPED | OUTPATIENT
Start: 2024-01-11

## 2024-01-11 RX ORDER — LAMOTRIGINE 200 MG/1
TABLET ORAL
COMMUNITY
Start: 2024-01-08

## 2024-01-11 NOTE — PATIENT INSTRUCTIONS
Medication changes:   Lamictal, increase to 200 mg/day  Abilify, continue as previously ordered.  Zyprexa, continue as previously ordered.  Xanax, continue as previously ordered.     Therapy recommendation:   Exercise: Increase light/moderate exercise as tolerated, a combination of cardiovascular and strength training has been shown to be most beneficial for mental health, start with small short-term goals, 3-4 days a week, 30 minutes a day, consistency is key.  Patient educated that baseline heart rate should increase by 10-20 beats during activity, for exercise to be considered moderate intensity, patient should begin to sweat within 10 to 12 minutes., Continue counseling/therapy with established therapist  Mindfulness-based stress reduction strategies have been shown to be effective in improving overall mental health by effectively lowering stress/anxiety levels, hand out provided for pt to review.    GENERAL NEW PATIENT INSTRUCTIONS:    -Please arrive in person or virtually 10-15 minutes prior to appointment to allow for registration/sign in/questionnaires. If you are seen virtually please review after visit summary (AVS)/patient instructions via [employer] for a summary of plan of care/changes in treatment plan. If you are having difficulties logging on or accessing [employer] please contact my office for assistance.    -The best way to get a hold of Merlin is to call the office at [phone] and ask to leave a message with his medical assistant. Merlin Quinonez is a Psychiatric Mental Health Nurse Practitioner, due to his specialty patient's are not able to message him directly via [facility]. Please give my office up to 48 hours to respond to a patient call/question/refill request. Refill requests will be made during normal office hours only, Monday-Friday 8:00-5:30.      -Merlin is out of the office on Wednesdays and weekends. Tuesdays and Thursdays are Merlin's in-office days, Mondays and Fridays are his telehealth days.   The decision to be seen virtually or in person is up to the discretion of the provider, not all behavioral health problems are appropriate for telehealth.    -Please call the office at [phone] with any worsening of symptoms or onset of intolerable side effects.  -Follow-up appointments must be maintained in order for prescribing to continue.  -Patient has been educated regarding multimodal approach with healthy nutrition, healthy sleep, regular physical activity, social activities, counseling, and medications.   -Please call 911 or go to the nearest ER if you begin to have thoughts of harming yourself or other people.

## 2024-01-29 ENCOUNTER — PRIOR AUTHORIZATION (OUTPATIENT)
Dept: BEHAVIORAL HEALTH | Facility: CLINIC | Age: 29
End: 2024-01-29
Payer: COMMERCIAL
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
## 2024-01-30 NOTE — TELEPHONE ENCOUNTER
PA approved. Approval letter will be uploaded to patient's chart once it is received. Patient notified of approval via [employer].

## 2024-02-02 ENCOUNTER — TELEPHONE (OUTPATIENT)
Dept: OBSTETRICS AND GYNECOLOGY | Age: 29
End: 2024-02-02
Payer: COMMERCIAL

## 2024-02-02 DIAGNOSIS — Z30.430 ENCOUNTER FOR IUD INSERTION: Primary | ICD-10-CM

## 2024-02-02 RX ORDER — MISOPROSTOL 200 UG/1
400 TABLET ORAL ONCE
Qty: 2 TABLET | Refills: 0 | Status: SHIPPED | OUTPATIENT
Start: 2024-02-02 | End: 2024-02-02

## 2024-02-02 NOTE — TELEPHONE ENCOUNTER
Pt is worried about the pain from the IUD insertion. Pt notd that she can take Ibuprofen. Pt states that she has a Xanax prescription and intends to take one of those before procedure but she would also like Cytotec sent to the pharmacy. Her sister told her that Cytotec would help.

## 2024-02-06 ENCOUNTER — OFFICE VISIT (OUTPATIENT)
Dept: OBSTETRICS AND GYNECOLOGY | Age: 29
End: 2024-02-06
Payer: COMMERCIAL

## 2024-02-06 ENCOUNTER — OFFICE VISIT (OUTPATIENT)
Dept: BEHAVIORAL HEALTH | Facility: CLINIC | Age: 29
End: 2024-02-06
Payer: COMMERCIAL

## 2024-02-06 VITALS
RESPIRATION RATE: 16 BRPM | DIASTOLIC BLOOD PRESSURE: 72 MMHG | BODY MASS INDEX: 30 KG/M2 | HEART RATE: 91 BPM | HEIGHT: 62 IN | OXYGEN SATURATION: 99 % | SYSTOLIC BLOOD PRESSURE: 118 MMHG | WEIGHT: 163 LBS

## 2024-02-06 VITALS
BODY MASS INDEX: 30.73 KG/M2 | HEIGHT: 62 IN | WEIGHT: 167 LBS | DIASTOLIC BLOOD PRESSURE: 60 MMHG | SYSTOLIC BLOOD PRESSURE: 100 MMHG

## 2024-02-06 DIAGNOSIS — G47.01 INSOMNIA DUE TO MEDICAL CONDITION: ICD-10-CM

## 2024-02-06 DIAGNOSIS — Z30.46 ENCOUNTER FOR REMOVAL OF ETONOGESTREL IMPLANT: ICD-10-CM

## 2024-02-06 DIAGNOSIS — F42.2 MIXED OBSESSIONAL THOUGHTS AND ACTS: ICD-10-CM

## 2024-02-06 DIAGNOSIS — F90.0 ADHD (ATTENTION DEFICIT HYPERACTIVITY DISORDER), INATTENTIVE TYPE: ICD-10-CM

## 2024-02-06 DIAGNOSIS — F41.0 GENERALIZED ANXIETY DISORDER WITH PANIC ATTACKS: ICD-10-CM

## 2024-02-06 DIAGNOSIS — Z12.4 SCREENING FOR CERVICAL CANCER: Primary | ICD-10-CM

## 2024-02-06 DIAGNOSIS — Z30.430 ENCOUNTER FOR IUD INSERTION: ICD-10-CM

## 2024-02-06 DIAGNOSIS — F33.1 MDD (MAJOR DEPRESSIVE DISORDER), RECURRENT EPISODE, MODERATE: Primary | ICD-10-CM

## 2024-02-06 DIAGNOSIS — F41.1 GENERALIZED ANXIETY DISORDER WITH PANIC ATTACKS: ICD-10-CM

## 2024-02-06 DIAGNOSIS — R45.851 SUICIDAL IDEATION: ICD-10-CM

## 2024-02-06 LAB
B-HCG UR QL: NEGATIVE
EXPIRATION DATE: NORMAL
INTERNAL NEGATIVE CONTROL: NORMAL
INTERNAL POSITIVE CONTROL: NORMAL
Lab: NORMAL

## 2024-02-06 PROCEDURE — 90833 PSYTX W PT W E/M 30 MIN: CPT

## 2024-02-06 PROCEDURE — 99214 OFFICE O/P EST MOD 30 MIN: CPT

## 2024-02-06 RX ORDER — MISOPROSTOL 200 UG/1
TABLET ORAL
COMMUNITY
Start: 2024-02-02

## 2024-02-06 RX ORDER — QUETIAPINE FUMARATE 25 MG/1
25-50 TABLET, FILM COATED ORAL NIGHTLY PRN
Qty: 60 TABLET | Refills: 0 | Status: SHIPPED | OUTPATIENT
Start: 2024-02-06

## 2024-02-06 NOTE — PATIENT INSTRUCTIONS
GENERAL NEW PATIENT INSTRUCTIONS:    -Please arrive in person or virtually 10-15 minutes prior to appointment to allow for registration/sign in/questionnaires. If you are seen virtually please review after visit summary (AVS)/patient instructions via [facility] for a summary of plan of care/changes in treatment plan. If you are having difficulties logging on or accessing [employer] please contact my office for assistance.    -The best way to get a hold of Merlin is to call the office at [phone] and ask to leave a message with his medical assistant. Merlin Quinonez is a Psychiatric Mental Health Nurse Practitioner, due to his specialty patient's are not able to message him directly via [facility]. Please give my office up to 48 hours to respond to a patient call/question/refill request. Refill requests will be made during normal office hours only, Monday-Friday 8:00-5:30.      -Merlin is out of the office on Wednesdays and weekends. Tuesdays and Thursdays are Merlin's in-office days, Mondays and Fridays are his telehealth days.  The decision to be seen virtually or in person is up to the discretion of the provider, not all behavioral health problems are appropriate for telehealth.    -Please call the office at [phone] with any worsening of symptoms or onset of intolerable side effects.  -Follow-up appointments must be maintained in order for prescribing to continue.  -Patient has been educated regarding multimodal approach with healthy nutrition, healthy sleep, regular physical activity, social activities, counseling, and medications.   -Please call 911 or go to the nearest ER if you begin to have thoughts of harming yourself or other people.

## 2024-02-06 NOTE — PROGRESS NOTES
"[facility]   Obstetrics and Gynecology     2024      Patient:  Vika Foley   MR#:[id number]    Office note    Chief Complaint   Patient presents with    Follow-up     C/o : patient coming in for Nexplanon removal IUD insertion annual exam   Patient stated \" no issues \"          Subjective     History of Present Illness  28 y.o. female  presenting for Pap smear IUD placement and Nexplanon removal.  She is otherwise doing well without complaints.        Patient Active Problem List   Diagnosis    MDD (major depressive disorder), recurrent episode, moderate    Generalized anxiety disorder with panic attacks    Mixed obsessional thoughts and acts    ADHD (attention deficit hyperactivity disorder), inattentive type    Suicidal ideation    Insomnia due to medical condition       Past Medical History:   Diagnosis Date    ADHD (attention deficit hyperactivity disorder)     was tested for it, but never treated for it    Anxiety     Depression     Migraine teenage years    Obsessive-compulsive disorder     diagnosed at the couch    Panic attacks     Self-injurious behavior      Past Surgical History:   Procedure Laterality Date    TONSILLECTOMY      WISDOM TOOTH EXTRACTION       Obstetric History:  OB History          0    Para   0    Term   0       0    AB   0    Living   0         SAB   0    IAB   0    Ectopic   0    Molar   0    Multiple   0    Live Births   0               Menstrual History:     Patient's last menstrual period was 2024 (approximate).       The patient has never been pregnant.  Family History   Problem Relation Age of Onset    Diabetes Mother         pre-diabetic    Thyroid disease Father         thyroid \"issue\" per patient    ADD / ADHD Sister     ADD / ADHD Sister     Anxiety disorder Sister     Diabetes Maternal Grandfather     Alzheimer's disease Paternal Grandfather      Social History     Tobacco Use    Smoking status: Never    Smokeless " "tobacco: Never   Vaping Use    Vaping Use: Never used   Substance Use Topics    Alcohol use: Yes     Comment: Drink occasionally    Drug use: Not Currently     Types: Marijuana     Comment: used to smoke regularly, have quit     Patient has no known allergies.    Current Outpatient Medications:     Abilify 5 MG tablet, Take 1 tablet by mouth Daily., Disp: 30 tablet, Rfl: 0    ALPRAZolam (Xanax) 0.25 MG tablet, , Disp: , Rfl:     Etonogestrel (NEXPLANON SC), Inject  under the skin into the appropriate area as directed., Disp: , Rfl:     lamoTRIgine (LaMICtal) 200 MG tablet, , Disp: , Rfl:     OLANZapine (zyPREXA) 5 MG tablet, , Disp: , Rfl:     QUEtiapine (SEROquel) 25 MG tablet, Take 1-2 tablets by mouth At Night As Needed (sleep)., Disp: 60 tablet, Rfl: 0    Vitamin D, Cholecalciferol, 50 MCG (2000 UT) capsule, Take  by mouth., Disp: , Rfl:     miSOPROStol (CYTOTEC) 200 MCG tablet, , Disp: , Rfl:       Review of Systems   All other systems reviewed and are negative.      BP Readings from Last 3 Encounters:   02/06/24 100/60   02/06/24 118/72   01/11/24 102/62      Wt Readings from Last 3 Encounters:   02/06/24 75.8 kg (167 lb)   02/06/24 73.9 kg (163 lb)   01/11/24 77.2 kg (170 lb 3.2 oz)      BMI: Estimated body mass index is 30.54 kg/m² as calculated from the following:    Height as of this encounter: 157.5 cm (62\").    Weight as of this encounter: 75.8 kg (167 lb). BSA: Estimated body surface area is 1.77 meters squared as calculated from the following:    Height as of this encounter: 157.5 cm (62\").    Weight as of this encounter: 75.8 kg (167 lb).    Objective   Physical Exam  Vitals and nursing note reviewed. Exam conducted with a chaperone present.   Constitutional:       Appearance: Normal appearance.   HENT:      Head: Normocephalic and atraumatic.   Pulmonary:      Effort: Pulmonary effort is normal.   Abdominal:      General: Abdomen is flat.      Palpations: Abdomen is soft.   Genitourinary:     Exam " position: Lithotomy position.      Labia:         Right: No rash or lesion.         Left: No rash or lesion.       Vagina: Normal. No vaginal discharge or lesions.      Cervix: Normal. No lesion.      Uterus: Normal. Not tender.       Adnexa: Right adnexa normal and left adnexa normal.        Right: No mass or tenderness.          Left: No mass or tenderness.     Skin:     General: Skin is warm and dry.   Neurological:      Mental Status: She is alert and oriented to person, place, and time.   Psychiatric:         Mood and Affect: Mood normal.     IUD Insertion Procedure Note    Indication: Desires long acting reversible contraception     Procedure Details   Urine pregnancy test confirmed negative.  The risks (including infection, bleeding, pain, and uterine perforation) and benefits of the procedure were explained to the patient and verbal informed consent was obtained. Time out was performed.     Cervix was cleansed with Betadine. Allis clamp applied to anterior cervix.  Uterus sounded to 8 cm. IUD inserted without difficulty. Strings trimmed to 2-3 cm.    IUD Information:  Mirena  Lot [id number]  Exp: 2026/April      Condition:  Stable    Complications:  None, patient tolerated the procedure well    Plan:  The patient was advised to call for fever, prolonged or severe pain, or persistent bleeding. She was advised to use NSAIDs as needed for mild to moderate pain.  Discussed using back up form of contraception for 1 week.  Discussed expectation of irregular bleeding for 3-6 months but then generally resolves.  Follow up PRN.    Procedure time: 7 minutes     Mary Gong MD  02/06/24  15:45 EST    Nexplanon Removal Procedure Note  Date of Insertion:  known  Date of Removal:  February 6, 2024    Information related to removal of the implant:   Reason(s) for removal:  Product life completed  Was implant palpable before removal?  Yes    Procedure Time Out Documentation  The risks of the procedure were reviewed  with the patient including bleeding, infection and unlikely damage to the insertion site and the benefits of the procedure were explained to the patient and verbal informed consent was obtained.  Time out was performed.    Procedure:    Implant identified.  Left upper arm prepped with alcohol prep pad.  1% lidocaine with epinephrine was injected at planned incision site.  A vertical 3mm incision was performed with scalpel at the distal end of implant.  The implant was removed using a hemostat and pop-out technique. The implant was inspected and found to be intact and complete.  Steri strips and a pressure dressing were applied to the site.  After removal instructions were given and verbally reviewed with the patient who acknowledged her understanding.    There were no difficulties with removal and patient tolerated well.    Procedure time: 7 minutes      Assessment & Plan   28 y.o. female  presenting for Pap smear IUD placement and Nexplanon removal.  She is otherwise doing well without complaints.    Diagnoses and all orders for this visit:    1. Screening for cervical cancer (Primary)  -     IGP, Apt HPV,rfx 16 / 18,45    2. Encounter for IUD insertion    3. Encounter for removal of etonogestrel implant      Return in 3 months for IUD check.     No follow-ups on file.    Mary Gong MD   2024 15:45 EST

## 2024-02-10 LAB
CYTOLOGIST CVX/VAG CYTO: ABNORMAL
CYTOLOGY CVX/VAG DOC CYTO: ABNORMAL
CYTOLOGY CVX/VAG DOC THIN PREP: ABNORMAL
DX ICD CODE: ABNORMAL
DX ICD CODE: ABNORMAL
HIV 1 & 2 AB SER-IMP: ABNORMAL
HPV I/H RISK 4 DNA CVX QL PROBE+SIG AMP: NEGATIVE
OTHER STN SPEC: ABNORMAL
PATHOLOGIST CVX/VAG CYTO: ABNORMAL
RECOM F/U CVX/VAG CYTO: ABNORMAL
STAT OF ADQ CVX/VAG CYTO-IMP: ABNORMAL

## 2024-02-21 DIAGNOSIS — F42.2 MIXED OBSESSIONAL THOUGHTS AND ACTS: ICD-10-CM

## 2024-02-21 DIAGNOSIS — F41.0 GENERALIZED ANXIETY DISORDER WITH PANIC ATTACKS: ICD-10-CM

## 2024-02-21 DIAGNOSIS — F41.1 GENERALIZED ANXIETY DISORDER WITH PANIC ATTACKS: ICD-10-CM

## 2024-02-21 DIAGNOSIS — F33.1 MDD (MAJOR DEPRESSIVE DISORDER), RECURRENT EPISODE, MODERATE: ICD-10-CM

## 2024-02-21 RX ORDER — ARIPIPRAZOLE 5 MG/1
5 TABLET ORAL DAILY
Qty: 30 TABLET | Refills: 0 | Status: SHIPPED | OUTPATIENT
Start: 2024-02-21

## 2024-02-21 NOTE — TELEPHONE ENCOUNTER
Patient contacted office to state she would like the generic form of Abilify sent to [facility] on [address]. Recently the brand was sent, but this is twice as expensive. Generic pended for pharmacy. Please advise.    LAST VISIT - 02/06/24  NEXT VISIT - 03/04/24
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
## 2024-03-18 ENCOUNTER — TELEMEDICINE (OUTPATIENT)
Dept: BEHAVIORAL HEALTH | Facility: CLINIC | Age: 29
End: 2024-03-18
Payer: COMMERCIAL

## 2024-03-18 DIAGNOSIS — G47.01 INSOMNIA DUE TO MEDICAL CONDITION: ICD-10-CM

## 2024-03-18 DIAGNOSIS — F41.0 GENERALIZED ANXIETY DISORDER WITH PANIC ATTACKS: ICD-10-CM

## 2024-03-18 DIAGNOSIS — F33.1 MDD (MAJOR DEPRESSIVE DISORDER), RECURRENT EPISODE, MODERATE: Primary | ICD-10-CM

## 2024-03-18 DIAGNOSIS — F41.1 GENERALIZED ANXIETY DISORDER WITH PANIC ATTACKS: ICD-10-CM

## 2024-03-18 NOTE — PATIENT INSTRUCTIONS
GENERAL NEW PATIENT INSTRUCTIONS:    -Please arrive in person or virtually 10-15 minutes prior to appointment to allow for registration/sign in/questionnaires. If you are seen virtually please review after visit summary (AVS)/patient instructions via Review Trackers for a summary of plan of care/changes in treatment plan. If you are having difficulties logging on or accessing Review Trackers please contact my office for assistance.    -The best way to get a hold of Merlin is to call the office at [phone] and ask to leave a message with his medical assistant. Merlin Quinonez is a Psychiatric Mental Health Nurse Practitioner, due to his specialty patient's are not able to message him directly via Review Trackers. Please give my office up to 48 hours to respond to a patient call/question/refill request. Refill requests will be made during normal office hours only, Monday-Friday 8:00-5:30.      -Merlin is out of the office on Wednesdays and weekends. Tuesdays and Thursdays are Merlin's in-office days, Mondays and Fridays are his telehealth days.  The decision to be seen virtually or in person is up to the discretion of the provider, not all behavioral health problems are appropriate for telehealth.    -Please call the office at [phone] with any worsening of symptoms or onset of intolerable side effects.  -Follow-up appointments must be maintained in order for prescribing to continue.  -Patient has been educated regarding multimodal approach with healthy nutrition, healthy sleep, regular physical activity, social activities, counseling, and medications.   -Please call 911 or go to the nearest ER if you begin to have thoughts of harming yourself or other people.

## 2024-03-28 ENCOUNTER — OFFICE VISIT (OUTPATIENT)
Dept: OBSTETRICS AND GYNECOLOGY | Age: 29
End: 2024-03-28
Payer: COMMERCIAL

## 2024-03-28 VITALS
SYSTOLIC BLOOD PRESSURE: 112 MMHG | BODY MASS INDEX: 30.47 KG/M2 | DIASTOLIC BLOOD PRESSURE: 74 MMHG | HEIGHT: 62 IN | WEIGHT: 165.6 LBS

## 2024-03-28 DIAGNOSIS — N93.9 ABNORMAL UTERINE BLEEDING (AUB): Primary | ICD-10-CM

## 2024-03-28 NOTE — PROGRESS NOTES
"[facility]   Obstetrics and Gynecology     3/28/2024      Patient:  Vika Foley   MR#:[id number]    Office note    Chief Complaint   Patient presents with    Follow-up     GYN F/U for Mirena IUD Check, Pt has no complaints today, Doing well        Subjective     History of Present Illness  28 y.o. female  for IUD check. She had been having some bleeding and spotting, but that has continued to improve.  No other complaints or concerns today          Patient Active Problem List   Diagnosis    MDD (major depressive disorder), recurrent episode, moderate    Generalized anxiety disorder with panic attacks    Mixed obsessional thoughts and acts    ADHD (attention deficit hyperactivity disorder), inattentive type    Insomnia due to medical condition       Past Medical History:   Diagnosis Date    ADHD (attention deficit hyperactivity disorder)     was tested for it, but never treated for it    Anxiety     Depression     Migraine teenage years    Obsessive-compulsive disorder     diagnosed at the couch    Panic attacks     Self-injurious behavior      Past Surgical History:   Procedure Laterality Date    TONSILLECTOMY      WISDOM TOOTH EXTRACTION       Obstetric History:  OB History          0    Para   0    Term   0       0    AB   0    Living   0         SAB   0    IAB   0    Ectopic   0    Molar   0    Multiple   0    Live Births   0               Menstrual History:     No LMP recorded (lmp unknown). Patient has had an implant.       The patient has never been pregnant.  Family History   Problem Relation Age of Onset    Diabetes Mother         pre-diabetic    Thyroid disease Father         thyroid \"issue\" per patient    ADD / ADHD Sister     ADD / ADHD Sister     Anxiety disorder Sister     Diabetes Maternal Grandfather     Alzheimer's disease Paternal Grandfather      Social History     Tobacco Use    Smoking status: Never    Smokeless tobacco: Never   Vaping Use    Vaping " "status: Never Used   Substance Use Topics    Alcohol use: Yes     Comment: Drink occasionally    Drug use: Not Currently     Types: Marijuana     Comment: used to smoke regularly, have quit     Patient has no known allergies.    Current Outpatient Medications:     ALPRAZolam (Xanax) 0.25 MG tablet, , Disp: , Rfl:     ARIPiprazole (Abilify) 5 MG tablet, Take 1 tablet by mouth Daily., Disp: 30 tablet, Rfl: 0    lamoTRIgine (LaMICtal) 200 MG tablet, , Disp: , Rfl:     Levonorgestrel (MIRENA) 20 MCG/DAY intrauterine device IUD, 1 each by Intrauterine route., Disp: , Rfl:     QUEtiapine (SEROquel) 25 MG tablet, Take 1-2 tablets by mouth At Night As Needed (sleep)., Disp: 60 tablet, Rfl: 0    Vitamin D, Cholecalciferol, 50 MCG (2000 UT) capsule, Take  by mouth., Disp: , Rfl:     Etonogestrel (NEXPLANON SC), Inject  under the skin into the appropriate area as directed., Disp: , Rfl:     miSOPROStol (CYTOTEC) 200 MCG tablet, , Disp: , Rfl:     OLANZapine (zyPREXA) 5 MG tablet, , Disp: , Rfl:       Review of Systems   All other systems reviewed and are negative.      BP Readings from Last 3 Encounters:   03/28/24 112/74   02/06/24 100/60   02/06/24 118/72      Wt Readings from Last 3 Encounters:   03/28/24 75.1 kg (165 lb 9.6 oz)   02/06/24 75.8 kg (167 lb)   02/06/24 73.9 kg (163 lb)      BMI: Estimated body mass index is 30.29 kg/m² as calculated from the following:    Height as of this encounter: 157.5 cm (62\").    Weight as of this encounter: 75.1 kg (165 lb 9.6 oz). BSA: Estimated body surface area is 1.76 meters squared as calculated from the following:    Height as of this encounter: 157.5 cm (62\").    Weight as of this encounter: 75.1 kg (165 lb 9.6 oz).    Objective   Physical Exam  Vitals and nursing note reviewed.   Constitutional:       Appearance: Normal appearance.   HENT:      Head: Normocephalic and atraumatic.   Pulmonary:      Effort: Pulmonary effort is normal.   Genitourinary:     General: Normal vulva. "      Vagina: Normal.      Cervix: Normal.      Comments: IUD strings visualized at cervical os.  Neurological:      Mental Status: She is alert and oriented to person, place, and time.   Psychiatric:         Mood and Affect: Mood normal.         Assessment & Plan   28 y.o. female  for IUD check    Diagnoses and all orders for this visit:    1. Abnormal uterine bleeding (AUB) (Primary)  Comments:  - Doing well with IUD, still having spotting but has improved.        No follow-ups on file.    Mary Gong MD   3/28/2024 16:24 EDT

## 2024-04-09 DIAGNOSIS — F42.2 MIXED OBSESSIONAL THOUGHTS AND ACTS: ICD-10-CM

## 2024-04-09 DIAGNOSIS — F41.1 GENERALIZED ANXIETY DISORDER WITH PANIC ATTACKS: ICD-10-CM

## 2024-04-09 DIAGNOSIS — F33.1 MDD (MAJOR DEPRESSIVE DISORDER), RECURRENT EPISODE, MODERATE: ICD-10-CM

## 2024-04-09 DIAGNOSIS — F41.0 GENERALIZED ANXIETY DISORDER WITH PANIC ATTACKS: ICD-10-CM

## 2024-04-09 RX ORDER — LAMOTRIGINE 200 MG/1
200 TABLET ORAL DAILY
Qty: 90 TABLET | Refills: 0 | Status: SHIPPED | OUTPATIENT
Start: 2024-04-09

## 2024-04-09 RX ORDER — ARIPIPRAZOLE 5 MG/1
5 TABLET ORAL DAILY
Qty: 90 TABLET | Refills: 0 | Status: SHIPPED | OUTPATIENT
Start: 2024-04-09

## 2024-04-09 NOTE — TELEPHONE ENCOUNTER
LAST REFILL - historical med for lamotrigine, 02/06/24 aripiprazole  LAST VISIT - 03/18/24  NEXT VISIT - 06/17/24    Patient called requesting refills of both medications, pended below to Georgie Escobar.

## 2024-06-17 ENCOUNTER — TELEMEDICINE (OUTPATIENT)
Dept: BEHAVIORAL HEALTH | Facility: CLINIC | Age: 29
End: 2024-06-17
Payer: COMMERCIAL

## 2024-06-17 DIAGNOSIS — F33.1 MDD (MAJOR DEPRESSIVE DISORDER), RECURRENT EPISODE, MODERATE: Primary | ICD-10-CM

## 2024-06-17 DIAGNOSIS — G47.01 INSOMNIA DUE TO MEDICAL CONDITION: ICD-10-CM

## 2024-06-17 DIAGNOSIS — F41.0 GENERALIZED ANXIETY DISORDER WITH PANIC ATTACKS: ICD-10-CM

## 2024-06-17 DIAGNOSIS — F41.1 GENERALIZED ANXIETY DISORDER WITH PANIC ATTACKS: ICD-10-CM

## 2024-06-17 PROCEDURE — 99214 OFFICE O/P EST MOD 30 MIN: CPT

## 2024-06-17 RX ORDER — LAMOTRIGINE 200 MG/1
200 TABLET ORAL DAILY
Qty: 90 TABLET | Refills: 1 | Status: SHIPPED | OUTPATIENT
Start: 2024-06-17

## 2024-06-17 RX ORDER — ARIPIPRAZOLE 5 MG/1
5 TABLET ORAL DAILY
Qty: 90 TABLET | Refills: 1 | Status: SHIPPED | OUTPATIENT
Start: 2024-06-17
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
## 2024-06-17 NOTE — PATIENT INSTRUCTIONS
GENERAL NEW PATIENT INSTRUCTIONS:    -Please arrive in person or virtually 10-15 minutes prior to appointment to allow for registration/sign in/questionnaires. If you are seen virtually please review after visit summary (AVS)/patient instructions via Lenet for a summary of plan of care/changes in treatment plan. If you are having difficulties logging on or accessing Lenet please contact my office for assistance.    -The best way to get a hold of Merlin is to call the office at [phone] and ask to leave a message with his medical assistant. Merlin Quinonez is a Psychiatric Mental Health Nurse Practitioner, due to his specialty patient's are not able to message him directly via Lenet. Please give my office up to 48 hours to respond to a patient call/question/refill request. Refill requests will be made during normal office hours only, Monday-Friday 8:00-5:30.      -Merlin is out of the office on Fridays and weekends. Tuesdays and Thursdays are Merlin's in-office days, Mondays and Wednesdays are his telehealth days.  The decision to be seen virtually or in person is up to the discretion of the provider, not all behavioral health problems are appropriate for telehealth.    -Please call the office at [phone] with any worsening of symptoms or onset of intolerable side effects.  -Follow-up appointments must be maintained in order for prescribing to continue.  -Patient has been educated regarding multimodal approach with healthy nutrition, healthy sleep, regular physical activity, social activities, counseling, and medications.   -Please call 911 or go to the nearest ER if you begin to have thoughts of harming yourself or other people.    No show policy:  We understand unexpected circumstances arise; however, anytime you miss your appointment we are unable to provide you appropriate care.  In addition, each appointment missed could have been used to provide care for others.  We ask that you call at least 24 hours in  advance to cancel or reschedule an appointment. We would like to take this opportunity to remind you of our policy stating patients who miss THREE or more appointments without cancelling or rescheduling 24 hours in advance of the appointment may be subject to cancellation of any further visits with our clinic and recommendation to seek in-person services/visits. Please call [phone] to reschedule your appointment. If there are reasons that make it difficult for you to keep the appointments, please call and let us know how we can help. Please understand that medication prescribing will not continue without seeing your provider.

## 2024-07-06 DIAGNOSIS — F41.1 GENERALIZED ANXIETY DISORDER WITH PANIC ATTACKS: ICD-10-CM

## 2024-07-06 DIAGNOSIS — F33.1 MDD (MAJOR DEPRESSIVE DISORDER), RECURRENT EPISODE, MODERATE: ICD-10-CM

## 2024-07-06 DIAGNOSIS — F41.0 GENERALIZED ANXIETY DISORDER WITH PANIC ATTACKS: ICD-10-CM

## 2024-07-08 RX ORDER — LAMOTRIGINE 200 MG/1
200 TABLET ORAL DAILY
Qty: 90 TABLET | Refills: 1 | OUTPATIENT
Start: 2024-07-08

## 2024-11-25 ENCOUNTER — OFFICE VISIT (OUTPATIENT)
Age: 29
End: 2024-11-25
Payer: COMMERCIAL

## 2024-11-25 VITALS
HEIGHT: 62 IN | DIASTOLIC BLOOD PRESSURE: 66 MMHG | BODY MASS INDEX: 29.44 KG/M2 | HEART RATE: 95 BPM | OXYGEN SATURATION: 98 % | WEIGHT: 160 LBS | SYSTOLIC BLOOD PRESSURE: 106 MMHG

## 2024-11-25 DIAGNOSIS — F41.1 GENERALIZED ANXIETY DISORDER WITH PANIC ATTACKS: ICD-10-CM

## 2024-11-25 DIAGNOSIS — F90.0 ADHD (ATTENTION DEFICIT HYPERACTIVITY DISORDER), INATTENTIVE TYPE: ICD-10-CM

## 2024-11-25 DIAGNOSIS — F41.0 GENERALIZED ANXIETY DISORDER WITH PANIC ATTACKS: ICD-10-CM

## 2024-11-25 DIAGNOSIS — F33.1 MDD (MAJOR DEPRESSIVE DISORDER), RECURRENT EPISODE, MODERATE: Primary | ICD-10-CM

## 2024-11-25 RX ORDER — LAMOTRIGINE 100 MG/1
TABLET ORAL
Qty: 23 TABLET | Refills: 0 | Status: SHIPPED | OUTPATIENT
Start: 2024-11-25 | End: 2024-12-23

## 2024-11-25 NOTE — PROGRESS NOTES
"Subjective   Vika Foley is a 29 y.o. female who presents today for initial evaluation     Chief Complaint:  \"my anxiety, panic attacks, and mood.\"     History of Present Illness:   Miss Vika Foley is a 29-year-old female seen as a new patient to establish care for ongoing psychiatric management due to the fact that most recent psychiatric provider, SONAM Gamino, is no longer employed with [employer].    Patient reports that she began seeing SONAM Gamino, around January 2024 and is currently prescribed a combination of lamotrigine and aripiprazole which she feels has significantly improved many of her depressive and anxious symptoms.  She continues to endorse a mildly depressed mood associated with intermittent periods of anhedonia, low levels of energy/fatigue, low self worth, difficulty sustaining her concentration, and excessive feelings of guilt but does report a very noticeable improvement in these symptoms when compared to her clinical status approximately 1 year ago.  She reports experiencing a major depressive episode roughly 1 year ago associated with the above depressive symptoms in addition to noticeable psychomotor retardation, excessive amounts of sleep, decreased appetite, and recurrent thoughts of death/suicide described as passive, intrusive, and fleeting in nature.  Patient denies ever experiencing any intent to act on these thoughts as well as ever developing a concrete plan to actively harm or kill herself.  She currently denies any active suicidal ideation, intent, or plan.  Patient does also endorse the persistence of numerous anxious symptoms but states that these symptoms have significantly decreased in severity as well when compared to her clinical status approximately 1 year ago.  She continues to endorse high levels of anxiety associated with excessive amounts of worry experienced about most things on most days.  She also reports significant difficulty controlling " and stopping this worry as well as difficulties winding down/relaxing.  Patient endorses near constant feelings of restlessness/feeling on edge associated with a frequent fear that something bad or awful is going to happen.  She does also endorse experiencing recurrent panic attacks described as acute episodes of anxiety associated with tachycardia, shortness of breath, tightness of the chest, and an intense fear that she is going to die or is going crazy.  She denies experiencing a panic attack over the past several months, but does endorse experiencing them on a near daily basis prior to establishing care with her most recent psychiatric provider.  Patient denies ever experiencing any overt manic episodes associated with a persistently elevated mood lasting for days or more at a time in addition to increased levels of energy, increased grandiosity, increased goal directed behaviors, a decreased need for sleep, racing thoughts, pressured speech, or uncharacteristic behaviors such as excessive spending on unnecessary items or increased sex drive/promiscuity.  She does however endorse experiencing brief periods of time lasting anywhere from 2 to 3 days to 1 week experienced with increased levels of energy, increased goal-directed behaviors, racing thoughts, pressured speech, and intermittently irritable mood associated with increased productivity overall.  Patient states that she will experience 1 of these episodes approximately once per month.  Patient does also endorse a lifelong history of rather significant difficulties sustaining/directing her attention and focus.  She also reports increased levels of distractibility and difficulties following directions/listening while being spoken to.  Patient also endorses difficulties with general organization stating that she will frequently procrastinate and have difficulty successfully completing tasks one at a time.  It is of note the patient did undergo  neuropsychological testing via [facility] in December 2020 which did indicate a diagnosis of ADHD.  Patient has never initiated ADHD medication.  Patient otherwise denies ever experiencing any auditory, visual, or tactile hallucinations and does not currently appear to be responding to internal stimuli.  She denies any history of generalized paranoia displays no evidence of delusional thinking.    Past Psychiatric History:  Patient reports that she began seeking psychiatric care while in college around 2017.  She has been seen by various therapists in the past and has been established with Cleo Larson for the past year.  Patient did see a psychiatrist at the [facility] approximately 2 years ago, but did eventually transition care to SONAM Fernandez due to insurance coverage issues.  She reports previous trials of olanzapine, fluoxetine, buspirone, Pristiq, quetiapine, and Xanax.  Patient is currently prescribed lamotrigine 200 mg p.o. once daily in addition to aripiprazole 5 mg p.o. nightly with a significant benefit in symptoms associated with these medications as detailed above.  Patient denies ever being admitted to an inpatient psychiatric unit.  She denies any history of suicide attempts or self-injurious behavior.  She has never been prescribed any additional psychiatric medications.    Family Psychiatric History:  Patient reports a history of numerous potential depressive and anxious symptoms in both biological parents, but states that they have never been formally diagnosed or treated.    Medical History:  Reviewed via EMR.  Patient denies any history of seizure disorders or traumatic brain injuries.    Substance Abuse History:  Patient reports the occasional use of alcohol and cannabis in social settings.  She does endorse rather frequent use of cannabis in the past stating that she did use on a daily basis for several years.  She denies use of any other illegal/illicit substance.    Social  History:  Patient was born and raised in [place], [place] and reports a relatively good childhood growing up.  Patient was the youngest of 3 children and is close to her older sisters currently.  Patient reports that she always did fairly well in school primarily receiving A's and B's.  Patient did graduate high school and has a bachelor's degree in art from [employer].  Patient is currently working for a local [employer] and has done so for the past 2 to 3 years.  Patient is currently single, has never been , and has no children.  She is currently living with her father here in town.  She denies any history of legal issues or  experience.    The following portions of the patient's history were reviewed and updated as appropriate: allergies, current medications, past family history, past medical history, past social history, past surgical history and problem list.       Past Medical History:  Past Medical History:   Diagnosis Date    ADHD (attention deficit hyperactivity disorder) 2020    was tested for it, but never treated for it    Anxiety     Depression     Migraine teenage years    Obsessive-compulsive disorder 2023    diagnosed at the couch    Panic attacks     Self-injurious behavior        Social History:  Social History     Socioeconomic History    Marital status: Single    Number of children: 0    Highest education level: Bachelor's degree (e.g., BA, AB, BS)   Tobacco Use    Smoking status: Never    Smokeless tobacco: Never   Vaping Use    Vaping status: Never Used   Substance and Sexual Activity    Alcohol use: Yes     Comment: Drink occasionally    Drug use: Not Currently     Types: Marijuana     Comment: used to smoke regularly, have quit    Sexual activity: Not Currently     Partners: Female     Birth control/protection: I.U.D.     Comment: Mirena IUD       Family History:  Family History   Problem Relation Age of Onset    Diabetes Mother         pre-diabetic    Thyroid  "disease Father         thyroid \"issue\" per patient    ADD / ADHD Sister     ADD / ADHD Sister     Anxiety disorder Sister     Diabetes Maternal Grandfather     Alzheimer's disease Paternal Grandfather        Past Surgical History:  Past Surgical History:   Procedure Laterality Date    TONSILLECTOMY      WISDOM TOOTH EXTRACTION         Problem List:  Patient Active Problem List   Diagnosis    MDD (major depressive disorder), recurrent episode, moderate    Generalized anxiety disorder with panic attacks    Mixed obsessional thoughts and acts    ADHD (attention deficit hyperactivity disorder), inattentive type    Insomnia due to medical condition       Allergy:   No Known Allergies     Current Medications:   Current Outpatient Medications   Medication Sig Dispense Refill    ALPRAZolam (Xanax) 0.25 MG tablet       ARIPiprazole (Abilify) 5 MG tablet Take 1 tablet by mouth Daily. 90 tablet 1    lamoTRIgine (LaMICtal) 100 MG tablet Take 0.5 tablets by mouth Daily for 14 days, THEN 1 tablet Daily for 14 days. Take in conjunction with separately prescribed lamotrigine 200 mg p.o. nightly. 23 tablet 0    lamoTRIgine (LaMICtal) 200 MG tablet Take 1 tablet by mouth Daily. 90 tablet 1    Levonorgestrel (MIRENA) 20 MCG/DAY intrauterine device IUD 1 each by Intrauterine route.      QUEtiapine (SEROquel) 25 MG tablet Take 1-2 tablets by mouth At Night As Needed (sleep). 60 tablet 0    Vitamin D, Cholecalciferol, 50 MCG (2000 UT) capsule Take  by mouth.       No current facility-administered medications for this visit.       Review of Symptoms:    Review of Systems   Constitutional:  Positive for activity change and fatigue.   HENT:  Negative for tinnitus.    Eyes:  Negative for visual disturbance.   Endocrine: Negative for cold intolerance and heat intolerance.   Skin:  Negative for rash.   Neurological:  Negative for memory problem and confusion.   Psychiatric/Behavioral:  Positive for decreased concentration, sleep disturbance, " "positive for hyperactivity and depressed mood. Negative for hallucinations, self-injury and suicidal ideas. The patient is nervous/anxious.        Physical Exam:   Blood pressure 106/66, pulse 95, height 157.5 cm (62.01\"), weight 72.6 kg (160 lb), SpO2 98%, not currently breastfeeding.  Appearance: Appears documented age, appropriate hygiene and grooming.  Gait, Station, Strength: Normal gait, station, and strength.       Mental Status Exam:   Hygiene:   good  Cooperation:  Cooperative  Eye Contact:  Good  Psychomotor Behavior:  Appropriate  Affect:  Full range and Appropriate  Mood: normal and euthymic  Hopelessness: Denies  Speech:  Normal  Thought Process:  Goal directed and Linear  Thought Content:  Normal  Suicidal:  None  Homicidal:  None  Hallucinations:  None  Delusion:  None  Memory:  Intact  Orientation:  Person, Place, Time, and Situation  Reliability:  good  Insight:  Fair  Judgement:  Good  Impulse Control:  Good      Lab Results:   No visits with results within 1 Month(s) from this visit.   Latest known visit with results is:   Office Visit on 02/06/2024   Component Date Value Ref Range Status    Diagnosis 02/06/2024 Comment (A)   Final    Comment: EPITHELIAL CELL ABNORMALITY.  ATYPICAL SQUAMOUS CELLS OF UNDETERMINED SIGNIFICANCE (ASC-US).      Recommendation: 02/06/2024 Comment (A)   Final    Suggest follow up as clinically appropriate.    Specimen adequacy: 02/06/2024 Comment   Final    Comment: Satisfactory for evaluation.  Endocervical and/or squamous metaplastic  cells (endocervical component) are present.      Clinician Provided ICD-10: 02/06/2024 Comment   Final    Z12.4    Performed by: 02/06/2024 Comment   Final    Jennifer Amaral, Cytotechnologist (ASCP)    Electronically signed by: 02/06/2024 Comment   Final    Ronaldo Mckay MD, Pathologist    . 02/06/2024 .   Final    Pathologist Provided ICD-10: 02/06/2024 Comment   Final    R87.610    Note: 02/06/2024 Comment   Final    Comment: The Pap smear " is a screening test designed to aid in the detection of  premalignant and malignant conditions of the uterine cervix.  It is not a  diagnostic procedure and should not be used as the sole means of detecting  cervical cancer.  Both false-positive and false-negative reports do occur.      Method: 02/06/2024 Comment   Final    Comment: This liquid based ThinPrep(R) pap test was screened with the  use of an image guided system.      HPV Aptima 02/06/2024 Negative  Negative Final    Comment: This nucleic acid amplification test detects fourteen high-risk  HPV types (16,18,31,33,35,39,45,51,52,56,58,59,66,68) without  differentiation.      HCG, Urine, QL 02/06/2024 Negative  Negative Final    Lot Number 02/06/2024 [id number]   Final    Internal Positive Control 02/06/2024 Passed  Positive, Passed Final    Internal Negative Control 02/06/2024 Passed  Negative, Passed Final    Expiration Date 02/06/2024 05-   Final       PHQ-9 Total Score: 8     Assessment & Plan    Diagnoses and all orders for this visit:    1. MDD (major depressive disorder), recurrent episode, moderate (Primary)  -     lamoTRIgine (LaMICtal) 100 MG tablet; Take 0.5 tablets by mouth Daily for 14 days, THEN 1 tablet Daily for 14 days. Take in conjunction with separately prescribed lamotrigine 200 mg p.o. nightly.  Dispense: 23 tablet; Refill: 0    2. Generalized anxiety disorder with panic attacks    3. ADHD (attention deficit hyperactivity disorder), inattentive type       Miss Vika Foley is a 29 year old female seen as a new patient to establish care for ongoing psychiatric management as most recent psychiatric provider SONAM Fernandez, is no longer employed with [facility].    Upon today's evaluation patient reports and displays numerous signs/symptoms most consistent with that of major depressive disorder, generalized anxiety disorder, and ADHD.  Patient reports last experiencing a major depressive episode approximately 1 year ago  prior to establishing care with most recent psychiatric provider, and does endorse a rather significant benefit in symptoms following initiation of current medication regimen.  She does however continue to endorse the persistence of numerous depressive and anxious symptoms at this time although they have decreased significantly in severity.  Patient's clinical picture is complicated by a history of recurrent episodes associated with numerous potentially hypomanic symptoms experienced roughly once every month for most of the patient's adult life.  Many of these symptoms are consistent with that of a hypomanic episode, but the patient denies experiencing a persistently elevated or persistently irritable mood during these brief periods lasting 2 to 3 days upwards of 1 week at a time.  It is also unclear whether or not patient's reported racing thoughts, increased goal-directed behaviors, and increased levels of energy during these periods of time are secondary to her psychiatric comorbidities including generalized anxiety disorder and ADHD.  We will attempt to further delineate patient's diagnoses in the future upon further assessment and pending response to treatment.  It is also of note that the patient was diagnosed with ADHD in 2020 after undergoing neuropsychological testing via [facility], but the patient was never initiated on any nonstimulant or stimulant medications for the management of ADHD despite previously seeing a psychiatrist through the couch.  While the patient appears to have managed her potential ADHD symptoms quite effectively throughout most of her adult life without the use of any pharmacological treatment, she very well may be experiencing increased difficulties appropriately managing these symptoms more recently resulting in a perceived worsening of anxiety associated anxious symptoms.  There is also a significant amount of overlap between the patient's potential hypomanic/more  activating symptoms and those of ADHD which does further complicate her clinical picture.  Given the therapeutic benefit associated with the patients current medication regimen I do recommend further maximizing daily dose of lamotrigine at this time in hopes of addressing her persistent depressive and anxious symptoms.  Patient further explains that she feels as if her lamotrigine has been most effective in addressing her psychiatric symptoms recently, and as such she is agreeable to further increasing daily dose at this time.  We will hold off on making any additional medication adjustments at this time and we will see the patient in approximately 4 weeks for reassessment.  Patient voices understanding of this and is agreeable to this plan.      Medications:  -Increase lamotrigine to 50 mg p.o. once daily each morning taken in conjunction with separately prescribed lamotrigine 200 mg p.o. nightly for a daily total of 250 mg as 14 days.  Pending tolerance of response further increase to lamotrigine 100 mg p.o. once daily each morning taken in conjunction with separately prescribed lamotrigine 200 mg p.o. nightly for a daily total of 300 mg thereafter.  -Continue aripiprazole 5 mg p.o. once nightly.  -Continue alprazolam 0.25 mg p.o. once daily as needed for acute anxiety prescribed previous psychiatric provider.  Patient states that she has not utilized this medication in approximately 1 year.      TREATMENT PLAN - SHORT AND LONG-TERM GOALS:   -Continue supportive psychotherapy efforts and medications as indicated. Treatment and medication options discussed during today's visit.   -Patient acknowledged and verbally consented to continue with current treatment plan and was educated on the importance of compliance with treatment and follow-up appointments.    SUMMARY/EDUCATION/DISCUSSION:  -Pt was given appropriate time to ask questions and concerns were addressed. A thorough discussion was had that included review of  disease process, need for continued monitoring and additional treatment options including use of pharmacological and non-pharmacological approaches to care, decisions were made and agreed upon by patient and provider.   -Discussed medication options and treatment plan of prescribed medication as well as the risks, benefits, and side effects including potential falls, possible impaired driving and metabolic adversities among others; patient acknowledged and provided verbal consent.   -Patient has been educated regarding multimodal approach with healthy nutrition, healthy sleep, regular physical activity, social activities, counseling, and medications.  -Please call the office at [phone] within normal business hours (Monday-Friday, 8:00 AM - 4:30 PM) with any worsening of symptoms or onset of intolerable side effects. Please ask to leave a message with office staff.  Please allow up to 24-48 hours for response to a patient call/question/refill request.  -Safety plan has been established and discussed in detail with the patient, who is agreeable to contact support system and/or call 911 or go to the nearest ER should he/she/they have any thoughts of harm to self or others.    MEDS ORDERED DURING VISIT:  New Medications Ordered This Visit   Medications    lamoTRIgine (LaMICtal) 100 MG tablet     Sig: Take 0.5 tablets by mouth Daily for 14 days, THEN 1 tablet Daily for 14 days. Take in conjunction with separately prescribed lamotrigine 200 mg p.o. nightly.     Dispense:  23 tablet     Refill:  0       FOLLOW UP:  Return in about 4 weeks (around 12/23/2024) for Next scheduled follow up.      Sebastián Moore DO    This document has been electronically signed by Sebastián Moore DO  November 25, 2024 14:24 EST    Part of this note may be an electronic transcription/translation of spoken language to printed text using the Dragon Dictation System. Some of the data in this electronic note has been brought  forward from a previous encounter, any necessary changes have been made, it has been reviewed by this provider, and it is accurate.

## 2024-12-23 ENCOUNTER — OFFICE VISIT (OUTPATIENT)
Age: 29
End: 2024-12-23
Payer: COMMERCIAL

## 2024-12-23 VITALS
OXYGEN SATURATION: 98 % | BODY MASS INDEX: 29.44 KG/M2 | SYSTOLIC BLOOD PRESSURE: 107 MMHG | HEART RATE: 89 BPM | DIASTOLIC BLOOD PRESSURE: 73 MMHG | WEIGHT: 160 LBS | HEIGHT: 62 IN

## 2024-12-23 DIAGNOSIS — F41.1 GENERALIZED ANXIETY DISORDER WITH PANIC ATTACKS: ICD-10-CM

## 2024-12-23 DIAGNOSIS — F90.0 ADHD (ATTENTION DEFICIT HYPERACTIVITY DISORDER), INATTENTIVE TYPE: ICD-10-CM

## 2024-12-23 DIAGNOSIS — F41.0 GENERALIZED ANXIETY DISORDER WITH PANIC ATTACKS: ICD-10-CM

## 2024-12-23 DIAGNOSIS — F33.1 MDD (MAJOR DEPRESSIVE DISORDER), RECURRENT EPISODE, MODERATE: Primary | ICD-10-CM

## 2024-12-23 RX ORDER — ARIPIPRAZOLE 5 MG/1
5 TABLET ORAL DAILY
Qty: 30 TABLET | Refills: 1 | Status: SHIPPED | OUTPATIENT
Start: 2024-12-23 | End: 2025-02-21

## 2024-12-23 RX ORDER — LAMOTRIGINE 200 MG/1
TABLET ORAL
Qty: 45 TABLET | Refills: 1 | Status: SHIPPED | OUTPATIENT
Start: 2024-12-23 | End: 2025-02-21

## 2024-12-23 NOTE — PROGRESS NOTES
Subjective   Vika Foley is a 29 y.o. female who presents today in follow up for management of major depressive disorder, generalized anxiety disorder, and ADHD.    Patient reports that she is doing well overall and does endorse a noticeable improvement in numerous psychiatric symptoms following dosage increase in lamotrigine at last visit.  More specifically, she reports a significantly improved mood associated with increased levels of energy/decreased fatigue, improved sleep, resolution of anhedonia, improved self worth, improved ability to sustain/direct her focus, and improved appetite.  She denies experiencing any suicidal ideation, intent, or plan.  Patient also reports an improvement in anxiety overall citing improved ability to control/stop her worry, decreased frequency of thinking that something bad or awful is going to happen, decreased periods of irritability, and improved ability to wind down/relax.  She denies experiencing any overt hypomanic/manic symptoms since last visit.  She does continue to endorse numerous ADHD symptoms but maintains that she continues to adequately manage these symptoms without the use of pharmacological treatment.  Patient otherwise denies any auditory, visual, or tactile hallucinations and does not currently appear to be responding to internal stimuli.  She denies any generalized paranoia and displays no evidence of delusional thinking.      The following portions of the patient's history were reviewed and updated as appropriate: allergies, current medications, past family history, past medical history, past social history, past surgical history and problem list.       Past Medical History:  Past Medical History:   Diagnosis Date    ADHD (attention deficit hyperactivity disorder) 2020    was tested for it, but never treated for it    Anxiety     Depression     Migraine teenage years    Obsessive-compulsive disorder 2023    diagnosed at the couch    Panic attacks      "Self-injurious behavior        Social History:  Social History     Socioeconomic History    Marital status: Single    Number of children: 0    Highest education level: Bachelor's degree (e.g., BA, AB, BS)   Tobacco Use    Smoking status: Never    Smokeless tobacco: Never   Vaping Use    Vaping status: Never Used   Substance and Sexual Activity    Alcohol use: Yes     Comment: Drink occasionally    Drug use: Not Currently     Types: Marijuana     Comment: used to smoke regularly, have quit    Sexual activity: Not Currently     Partners: Female     Birth control/protection: I.U.D.     Comment: Mirena IUD       Family History:  Family History   Problem Relation Age of Onset    Diabetes Mother         pre-diabetic    Thyroid disease Father         thyroid \"issue\" per patient    ADD / ADHD Sister     ADD / ADHD Sister     Anxiety disorder Sister     Diabetes Maternal Grandfather     Alzheimer's disease Paternal Grandfather        Past Surgical History:  Past Surgical History:   Procedure Laterality Date    TONSILLECTOMY      WISDOM TOOTH EXTRACTION         Problem List:  Patient Active Problem List   Diagnosis    MDD (major depressive disorder), recurrent episode, moderate    Generalized anxiety disorder with panic attacks    Mixed obsessional thoughts and acts    ADHD (attention deficit hyperactivity disorder), inattentive type    Insomnia due to medical condition       Allergy:   No Known Allergies     Current Medications:   Current Outpatient Medications   Medication Sig Dispense Refill    ALPRAZolam (Xanax) 0.25 MG tablet       ARIPiprazole (Abilify) 5 MG tablet Take 1 tablet by mouth Daily. 90 tablet 1    lamoTRIgine (LaMICtal) 100 MG tablet Take 0.5 tablets by mouth Daily for 14 days, THEN 1 tablet Daily for 14 days. Take in conjunction with separately prescribed lamotrigine 200 mg p.o. nightly. 23 tablet 0    lamoTRIgine (LaMICtal) 200 MG tablet Take 1 tablet by mouth Daily. 90 tablet 1    Levonorgestrel (MIRENA) " "20 MCG/DAY intrauterine device IUD 1 each by Intrauterine route.      QUEtiapine (SEROquel) 25 MG tablet Take 1-2 tablets by mouth At Night As Needed (sleep). 60 tablet 0    Vitamin D, Cholecalciferol, 50 MCG (2000 UT) capsule Take  by mouth.       No current facility-administered medications for this visit.       Review of Symptoms:    Review of Systems   Constitutional:  Negative for chills, diaphoresis, fatigue and fever.   HENT:  Negative for congestion, sore throat and swollen glands.    Respiratory:  Negative for cough.    Cardiovascular:  Negative for chest pain.   Gastrointestinal:  Negative for abdominal pain, nausea and vomiting.   Genitourinary:  Negative for dysuria.   Musculoskeletal:  Negative for myalgias and neck pain.   Skin:  Negative for rash.   Neurological:  Negative for weakness and numbness.   Psychiatric/Behavioral:  Positive for depressed mood. Negative for decreased concentration, hallucinations, self-injury, sleep disturbance and suicidal ideas.          Physical Exam:   Blood pressure 107/73, pulse 89, height 157.5 cm (62.01\"), weight 72.6 kg (160 lb), SpO2 98%, not currently breastfeeding.  Appearance: Appears documented age, appropriate hygiene and grooming.  Gait, Station, Strength: Normal gait, station, and strength.       Mental Status Exam:   Hygiene:   good  Cooperation:  Cooperative  Eye Contact:  Good  Psychomotor Behavior:  Appropriate  Affect:  Full range and Appropriate  Mood: normal and euthymic  Hopelessness: Denies  Speech:  Normal  Thought Process:  Goal directed and Linear  Thought Content:  Normal  Suicidal:  None  Homicidal:  None  Hallucinations:  None  Delusion:  None  Memory:  Intact  Orientation:  Person, Place, Time, and Situation  Reliability:  good  Insight:  Good  Judgement:  Good  Impulse Control:  Good      Lab Results:   No visits with results within 1 Month(s) from this visit.   Latest known visit with results is:   Office Visit on 02/06/2024   Component " Date Value Ref Range Status    Diagnosis 02/06/2024 Comment (A)   Final    Comment: EPITHELIAL CELL ABNORMALITY.  ATYPICAL SQUAMOUS CELLS OF UNDETERMINED SIGNIFICANCE (ASC-US).      Recommendation: 02/06/2024 Comment (A)   Final    Suggest follow up as clinically appropriate.    Specimen adequacy: 02/06/2024 Comment   Final    Comment: Satisfactory for evaluation.  Endocervical and/or squamous metaplastic  cells (endocervical component) are present.      Clinician Provided ICD-10: 02/06/2024 Comment   Final    Z12.4    Performed by: 02/06/2024 Comment   Final    Jennifer Amaral, Cytotechnologist (ASCP)    Electronically signed by: 02/06/2024 Comment   Final    Ronaldo Mckay MD, Pathologist    . 02/06/2024 .   Final    Pathologist Provided ICD-10: 02/06/2024 Comment   Final    R87.610    Note: 02/06/2024 Comment   Final    Comment: The Pap smear is a screening test designed to aid in the detection of  premalignant and malignant conditions of the uterine cervix.  It is not a  diagnostic procedure and should not be used as the sole means of detecting  cervical cancer.  Both false-positive and false-negative reports do occur.      Method: 02/06/2024 Comment   Final    Comment: This liquid based ThinPrep(R) pap test was screened with the  use of an image guided system.      HPV Aptima 02/06/2024 Negative  Negative Final    Comment: This nucleic acid amplification test detects fourteen high-risk  HPV types (16,18,31,33,35,39,45,51,52,56,58,59,66,68) without  differentiation.      HCG, Urine, QL 02/06/2024 Negative  Negative Final    Lot Number 02/06/2024 [id number]   Final    Internal Positive Control 02/06/2024 Passed  Positive, Passed Final    Internal Negative Control 02/06/2024 Passed  Negative, Passed Final    Expiration Date 02/06/2024 05-   Final       PHQ-9 Total Score:  5  AUDI-7 Total Score:  4    Assessment & Plan    Diagnoses and all orders for this visit:    1. MDD (major depressive disorder), recurrent  episode, moderate (Primary)    2. Generalized anxiety disorder with panic attacks    3. ADHD (attention deficit hyperactivity disorder), inattentive type         Vika Foley is a 29 y.o. female who presents today in follow up for management of major depressive disorder, generalized anxiety disorder, and ADHD.    As detailed above patient reports that she is doing well overall and currently endorses a rather significant benefit associated with recently increased dose of lamotrigine as evidenced by current PHQ-9 and AUDI-7 scores..  She reports consistent compliance with all psychiatric medications, denies any associated side effects and appears to be tolerating these medications well.  Given rather significant benefit associated with increased dose of lamotrigine I recommend making no further medication adjustments at this time.  We did however discussed in great detail at today's visit the patient's current psychiatric diagnoses in hopes of further delineating her diagnoses in the future pending further assessment and tolerance/response to current medication regimen.  It was also discussed with the patient that we very well may attempt to simplify patient's medication regimen in the future given little to no therapeutic benefit associated with current dose of aripiprazole given this medications potential side effects including tardive dyskinesia, and other metabolic issues such as increased blood glucose and elevated lipids.  As such, we will further address potentially simplifying patient's medications in the future if necessary.  Given reported stability of the patient she will be seen again in approximately 8 weeks for reassessment.  Patient voices understanding of this and is agreeable to today's plan.    Medications:  -Continue lamotrigine 100 mg p.o. once daily each morning in addition to lamotrigine 200 mg p.o. nightly for a daily total of lamotrigine 300 mg.  -Continue aripiprazole 5 mg p.o. once nightly  at this time.  -Continue alprazolam 0.25 mg p.o. once daily as needed for acute anxiety prescribed previous psychiatric provider.  She has not utilized this medication in roughly 1 year due to adequate management of symptoms.      TREATMENT PLAN - SHORT AND LONG-TERM GOALS:   -Continue supportive psychotherapy efforts and medications as indicated. Treatment and medication options discussed during today's visit.   -Patient acknowledged and verbally consented to continue with current treatment plan and was educated on the importance of compliance with treatment and follow-up appointments.    SUMMARY/EDUCATION/DISCUSSION:  -Pt was given appropriate time to ask questions and concerns were addressed. A thorough discussion was had that included review of disease process, need for continued monitoring and additional treatment options including use of pharmacological and non-pharmacological approaches to care, decisions were made and agreed upon by patient and provider.   -Discussed medication options and treatment plan of prescribed medication as well as the risks, benefits, and side effects including potential falls, possible impaired driving and metabolic adversities among others; patient acknowledged and provided verbal consent.   -Patient has been educated regarding multimodal approach with healthy nutrition, healthy sleep, regular physical activity, social activities, counseling, and medications.  -Please call the office at [phone] within normal business hours (Monday-Friday, 8:00 AM - 4:30 PM) with any worsening of symptoms or onset of intolerable side effects. Please ask to leave a message with office staff.  Please allow up to 24-48 hours for response to a patient call/question/refill request.  -Safety plan has been established and discussed in detail with the patient, who is agreeable to contact support system and/or call 911 or go to the nearest ER should he/she/they have any thoughts of harm to self or  others.    MEDS ORDERED DURING VISIT:  No orders of the defined types were placed in this encounter.      FOLLOW UP:  Return in about 8 weeks (around 2/17/2025) for Next scheduled follow up.      Sebastián Moore DO    This document has been electronically signed by Sebastáin Moore DO  December 23, 2024 10:08 EST    Part of this note may be an electronic transcription/translation of spoken language to printed text using the Dragon Dictation System. Some of the data in this electronic note has been brought forward from a previous encounter, any necessary changes have been made, it has been reviewed by this provider, and it is accurate.

## 2025-01-15 ENCOUNTER — OFFICE VISIT (OUTPATIENT)
Dept: OBSTETRICS AND GYNECOLOGY | Age: 30
End: 2025-01-15
Payer: COMMERCIAL

## 2025-01-15 VITALS
DIASTOLIC BLOOD PRESSURE: 70 MMHG | BODY MASS INDEX: 30.03 KG/M2 | HEIGHT: 62 IN | SYSTOLIC BLOOD PRESSURE: 100 MMHG | WEIGHT: 163.2 LBS

## 2025-01-15 DIAGNOSIS — Z01.419 ENCOUNTER FOR ANNUAL ROUTINE GYNECOLOGICAL EXAMINATION: Primary | ICD-10-CM

## 2025-01-15 DIAGNOSIS — R87.610 ATYPICAL SQUAMOUS CELLS OF UNDETERMINED SIGNIFICANCE (ASCUS) ON PAPANICOLAOU SMEAR OF CERVIX: ICD-10-CM

## 2025-01-15 NOTE — PROGRESS NOTES
[facility]   Obstetrics and Gynecology     1/15/2025    Patient: Vika Foley          MR#:[id number]    History of Present Illness    Chief Complaint   Patient presents with    Gynecologic Exam     Annual today, last annual 01/10/24, Pap 24 was abn for ASCUS, pt has no complaints today.       29 y.o. female  who presents for annual exam. Doing well overall, has noticed some acne since removal of the Nexplanon.     Relevant data reviewed:    No LMP recorded. Patient has had an implant.  Obstetric History:  OB History          0    Para   0    Term   0       0    AB   0    Living   0         SAB   0    IAB   0    Ectopic   0    Molar   0    Multiple   0    Live Births   0               Menstrual History:     No LMP recorded. Patient has had an implant.       Social History     Substance and Sexual Activity   Sexual Activity Not Currently    Partners: Female    Birth control/protection: I.U.D.    Comment: Mirena IUD     ______________________________________  Patient Active Problem List   Diagnosis    MDD (major depressive disorder), recurrent episode, moderate    Generalized anxiety disorder with panic attacks    Mixed obsessional thoughts and acts    ADHD (attention deficit hyperactivity disorder), inattentive type    Insomnia due to medical condition     Past Medical History:   Diagnosis Date    ADHD (attention deficit hyperactivity disorder)     was tested for it, but never treated for it    Anxiety     Depression     Migraine teenage years    Obsessive-compulsive disorder     diagnosed at the couch    Panic attacks     Self-injurious behavior      Past Surgical History:   Procedure Laterality Date    TONSILLECTOMY      WISDOM TOOTH EXTRACTION       Social History     Tobacco Use   Smoking Status Never   Smokeless Tobacco Never     Family History   Problem Relation Age of Onset    Diabetes Mother         pre-diabetic    Thyroid disease Father         thyroid  "\"issue\" per patient    ADD / ADHD Sister     ADD / ADHD Sister     Anxiety disorder Sister     Diabetes Maternal Grandfather     Alzheimer's disease Paternal Grandfather      Prior to Admission medications    Medication Sig Start Date End Date Taking? Authorizing Provider   ALPRAZolam (Xanax) 0.25 MG tablet  6/12/23  Yes Maria G Celestin MD   ARIPiprazole (Abilify) 5 MG tablet Take 1 tablet by mouth Daily for 60 days. 12/23/24 2/21/25 Yes Sebastián Moore, DO   lamoTRIgine (LaMICtal) 200 MG tablet Take 0.5 tablets by mouth Every Morning Before Breakfast AND 1 tablet every night at bedtime. Do all this for 60 days. 12/23/24 2/21/25 Yes Sebastián Moore, DO   Levonorgestrel (MIRENA) 20 MCG/DAY intrauterine device IUD To be inserted one time by prescriber. Route intrauterine.   Yes Maria G Celestin MD   Vitamin D, Cholecalciferol, 50 MCG (2000 UT) capsule Take  by mouth.   Yes Maria G Celestin MD   QUEtiapine (SEROquel) 25 MG tablet Take 1-2 tablets by mouth At Night As Needed (sleep).  Patient not taking: Reported on 1/15/2025 2/6/24   Bharat Quinonez APRN     _______________________________________    Current contraception: IUD  History of abnormal Pap smear: yes - ASCUS last year.   Family history of uterine or ovarian cancer: no  Family History of colon cancer/colon polyps: no      The following portions of the patient's history were reviewed and updated as appropriate: allergies, current medications, past family history, past medical history, past social history, past surgical history, and problem list.        Pertinent items are noted in HPI.       Objective   Physical Exam  Vitals and nursing note reviewed. Exam conducted with a chaperone present.   Constitutional:       Appearance: Normal appearance.   HENT:      Head: Normocephalic and atraumatic.   Pulmonary:      Effort: Pulmonary effort is normal.   Chest:   Breasts:     Right: Normal.      Left: Normal.   Abdominal:      General: " "Abdomen is flat.      Palpations: Abdomen is soft.   Genitourinary:     Exam position: Lithotomy position.      Labia:         Right: No rash or lesion.         Left: No rash or lesion.       Vagina: Normal. No vaginal discharge or lesions.      Cervix: Normal. No lesion.      Uterus: Normal. Not tender.       Adnexa: Right adnexa normal and left adnexa normal.        Right: No mass or tenderness.          Left: No mass or tenderness.     Lymphadenopathy:      Upper Body:      Right upper body: No supraclavicular, axillary or pectoral adenopathy.      Left upper body: No supraclavicular, axillary or pectoral adenopathy.   Skin:     General: Skin is warm and dry.   Neurological:      Mental Status: She is alert and oriented to person, place, and time.   Psychiatric:         Mood and Affect: Mood normal.         /70   Ht 157.5 cm (62.01\")   Wt 74 kg (163 lb 3.2 oz)   BMI 29.84 kg/m²    BP Readings from Last 3 Encounters:   01/15/25 100/70   24 107/73   24 106/66      Wt Readings from Last 3 Encounters:   01/15/25 74 kg (163 lb 3.2 oz)   24 72.6 kg (160 lb)   24 72.6 kg (160 lb)        BMI: Estimated body mass index is 29.84 kg/m² as calculated from the following:    Height as of this encounter: 157.5 cm (62.01\").    Weight as of this encounter: 74 kg (163 lb 3.2 oz).    As part of wellness and prevention, the following topics were discussed with the patient:  Encouraged self breast exam  Sexual behavior/safe practices discussed.   Sexual transmitted disease prevention   Contraception discussed.   Dental health discussed  Vaccinations/immunizations addressed.           Problem List   Meds  History  Prep for Surg   Imagin}    Assessment:  29 y.o. female  who presents for annual exam.  Diagnoses and all orders for this visit:    1. Encounter for annual routine gynecological examination (Primary)  -     IGP, Rfx Aptima HPV ASCU    2. Atypical squamous cells of undetermined " significance (ASCUS) on Papanicolaou smear of cervix  -     IGP, Rfx Aptima HPV ASCU    - Pap ASCUS last year, repeated today  - Mammo/Colonoscopy not yet indicated  - Vaccine recs reviewed  - STI screening declined      Plan:  No follow-ups on file.    Mary Gong MD  1/15/2025 10:33 EST

## 2025-01-20 LAB
CONV .: NORMAL
CYTOLOGIST CVX/VAG CYTO: NORMAL
CYTOLOGY CVX/VAG DOC CYTO: NORMAL
CYTOLOGY CVX/VAG DOC THIN PREP: NORMAL
DX ICD CODE: NORMAL
Lab: NORMAL
Lab: NORMAL
OTHER STN SPEC: NORMAL
STAT OF ADQ CVX/VAG CYTO-IMP: NORMAL

## 2025-02-17 ENCOUNTER — OFFICE VISIT (OUTPATIENT)
Age: 30
End: 2025-02-17
Payer: COMMERCIAL

## 2025-02-17 VITALS
HEART RATE: 96 BPM | OXYGEN SATURATION: 98 % | SYSTOLIC BLOOD PRESSURE: 129 MMHG | DIASTOLIC BLOOD PRESSURE: 77 MMHG | WEIGHT: 163 LBS | HEIGHT: 62 IN | BODY MASS INDEX: 30 KG/M2

## 2025-02-17 DIAGNOSIS — F41.0 GENERALIZED ANXIETY DISORDER WITH PANIC ATTACKS: ICD-10-CM

## 2025-02-17 DIAGNOSIS — F90.0 ADHD (ATTENTION DEFICIT HYPERACTIVITY DISORDER), INATTENTIVE TYPE: ICD-10-CM

## 2025-02-17 DIAGNOSIS — F41.1 GENERALIZED ANXIETY DISORDER WITH PANIC ATTACKS: ICD-10-CM

## 2025-02-17 DIAGNOSIS — F33.1 MDD (MAJOR DEPRESSIVE DISORDER), RECURRENT EPISODE, MODERATE: Primary | ICD-10-CM

## 2025-02-17 PROCEDURE — 99214 OFFICE O/P EST MOD 30 MIN: CPT | Performed by: STUDENT IN AN ORGANIZED HEALTH CARE EDUCATION/TRAINING PROGRAM

## 2025-02-17 PROCEDURE — 96127 BRIEF EMOTIONAL/BEHAV ASSMT: CPT | Performed by: STUDENT IN AN ORGANIZED HEALTH CARE EDUCATION/TRAINING PROGRAM

## 2025-02-17 RX ORDER — ARIPIPRAZOLE 2 MG/1
2 TABLET ORAL DAILY
Qty: 30 TABLET | Refills: 0 | Status: SHIPPED | OUTPATIENT
Start: 2025-02-17 | End: 2025-03-19

## 2025-02-17 RX ORDER — LAMOTRIGINE 200 MG/1
TABLET ORAL
Qty: 45 TABLET | Refills: 2 | Status: SHIPPED | OUTPATIENT
Start: 2025-02-17 | End: 2025-05-18

## 2025-02-17 NOTE — PROGRESS NOTES
Subjective   Vika Foley is a 29 y.o. female who presents today in follow up for management of major depressive disorder, generalized anxiety disorder and ADHD.    Patient reports that she is doing well overall since last visit approximately 8 weeks ago and endorses sustained improvement in mood/associated depressive symptoms following increase in lamotrigine.  She reports consistent compliance with all psychiatric medications, denies any associated side effects, and appears to be tolerating these medications well.  Patient once again voices ongoing mood stability and currently denies a depressed mood as well as any other significant depressive symptoms such as low self worth, anhedonia, or any suicidal ideation, intent or plan.  She does however continue to endorse low levels of energy/fatigue associated with a decreased appetite, difficulties initiating/maintaining sleep, and difficulty sustaining her attention/focus.  Patient does also report ongoing improvement in overall anxiety and denies worrying excessively about most things on most days since last visit.  She also reports an improved ability to control/stop her anxiety and worry.  She does continue to endorse numerous ADHD symptoms but once again endorses adequate management of these symptoms without pharmacological management. Patient otherwise denies any auditory, visual, or tactile hallucinations and does not currently appear to be responding to internal stimuli.  Patient denies any generalized paranoia and displays no evidence of delusional thinking.    The following portions of the patient's history were reviewed and updated as appropriate: allergies, current medications, past family history, past medical history, past social history, past surgical history and problem list.       Past Medical History:  Past Medical History:   Diagnosis Date    ADHD (attention deficit hyperactivity disorder) 2020    was tested for it, but never treated for it     "Anxiety     Depression     Migraine teenage years    Obsessive-compulsive disorder 2023    diagnosed at the couch    Panic attacks     Self-injurious behavior        Social History:  Social History     Socioeconomic History    Marital status: Single    Number of children: 0    Highest education level: Bachelor's degree (e.g., BA, AB, BS)   Tobacco Use    Smoking status: Never    Smokeless tobacco: Never   Vaping Use    Vaping status: Never Used   Substance and Sexual Activity    Alcohol use: Yes     Comment: Drink occasionally    Drug use: Not Currently     Types: Marijuana     Comment: used to smoke regularly, have quit    Sexual activity: Not Currently     Partners: Female     Birth control/protection: I.U.D.     Comment: Mirena IUD       Family History:  Family History   Problem Relation Age of Onset    Diabetes Mother         pre-diabetic    Thyroid disease Father         thyroid \"issue\" per patient    ADD / ADHD Sister     ADD / ADHD Sister     Anxiety disorder Sister     Diabetes Maternal Grandfather     Alzheimer's disease Paternal Grandfather        Past Surgical History:  Past Surgical History:   Procedure Laterality Date    TONSILLECTOMY      WISDOM TOOTH EXTRACTION         Problem List:  Patient Active Problem List   Diagnosis    MDD (major depressive disorder), recurrent episode, moderate    Generalized anxiety disorder with panic attacks    Mixed obsessional thoughts and acts    ADHD (attention deficit hyperactivity disorder), inattentive type    Insomnia due to medical condition       Allergy:   No Known Allergies     Current Medications:   Current Outpatient Medications   Medication Sig Dispense Refill    ARIPiprazole (Abilify) 2 MG tablet Take 1 tablet by mouth Daily for 30 days. 30 tablet 0    lamoTRIgine (LaMICtal) 200 MG tablet Take 0.5 tablets by mouth Every Morning Before Breakfast AND 1 tablet every night at bedtime. Do all this for 90 days. 45 tablet 2    ALPRAZolam (Xanax) 0.25 MG tablet    " "   Levonorgestrel (MIRENA) 20 MCG/DAY intrauterine device IUD To be inserted one time by prescriber. Route intrauterine.      QUEtiapine (SEROquel) 25 MG tablet Take 1-2 tablets by mouth At Night As Needed (sleep). (Patient not taking: Reported on 1/15/2025) 60 tablet 0    Vitamin D, Cholecalciferol, 50 MCG (2000 UT) capsule Take  by mouth.       No current facility-administered medications for this visit.       Review of Symptoms:    Review of Systems   Constitutional:  Negative for chills, diaphoresis, fatigue and fever.   HENT:  Negative for congestion, sore throat and swollen glands.    Respiratory:  Negative for cough.    Cardiovascular:  Negative for chest pain.   Gastrointestinal:  Negative for abdominal pain, nausea and vomiting.   Genitourinary:  Negative for dysuria.   Musculoskeletal:  Negative for myalgias and neck pain.   Skin:  Negative for rash.   Neurological:  Negative for weakness and numbness.   Psychiatric/Behavioral:  Positive for decreased concentration. Negative for hallucinations, self-injury, sleep disturbance, suicidal ideas and depressed mood. The patient is nervous/anxious.          Physical Exam:   Blood pressure 129/77, pulse 96, height 157.5 cm (62.01\"), weight 73.9 kg (163 lb), SpO2 98%, not currently breastfeeding.  Appearance: Appears documented age, appropriate hygiene and grooming  Gait, Station, Strength: Normal gait, station and strength.       Mental Status Exam:   Hygiene:   good  Cooperation:  Cooperative  Eye Contact:  Good  Psychomotor Behavior:  Appropriate  Affect:  Full range and Appropriate  Mood: normal and euthymic  Hopelessness: Denies  Speech:  Normal  Thought Process:  Goal directed and Linear  Thought Content:  Normal  Suicidal:  None  Homicidal:  None  Hallucinations:  None  Delusion:  None  Memory:  Intact  Orientation:  Person, Place, Time, and Situation  Reliability:  good  Insight:  Good  Judgement:  Good  Impulse Control:  Good      Lab Results:   No visits " with results within 1 Month(s) from this visit.   Latest known visit with results is:   Office Visit on 01/15/2025   Component Date Value Ref Range Status    Diagnosis 01/15/2025 Comment   Final    Comment: NEGATIVE FOR INTRAEPITHELIAL LESION OR MALIGNANCY.  THIS SPECIMEN WAS RESCREENED AS PART OF OUR  PROGRAM.      Specimen adequacy: 01/15/2025 Comment   Final    Comment: Satisfactory for evaluation.  Endocervical and/or squamous metaplastic  cells (endocervical component) are present.      Clinician Provided ICD-10: 01/15/2025 Comment   Final    Z01.419    Performed by: 01/15/2025 Comment   Final    Antoinette Sams, Cytotechnologist (ASCP)    QC reviewed by: 01/15/2025 Comment   Final    Kathie Mendosa, Cytotechnologist (ASCP)    . 01/15/2025 .   Final    Note: 01/15/2025 Comment   Final    Comment: The Pap smear is a screening test designed to aid in the detection of  premalignant and malignant conditions of the uterine cervix.  It is not a  diagnostic procedure and should not be used as the sole means of detecting  cervical cancer.  Both false-positive and false-negative reports do occur.      Method: 01/15/2025 Comment   Final    Comment: This liquid based ThinPrep(R) pap test was screened with the  use of an image guided system.      Conv .conv 01/15/2025 Comment   Final    Comment: The HPV DNA reflex criteria were not met with this specimen result  therefore, no HPV testing was performed.         PHQ-9 Total Score:  4   AUDI-7 Total Score:  10    Assessment & Plan    Diagnoses and all orders for this visit:    1. MDD (major depressive disorder), recurrent episode, moderate (Primary)  -     lamoTRIgine (LaMICtal) 200 MG tablet; Take 0.5 tablets by mouth Every Morning Before Breakfast AND 1 tablet every night at bedtime. Do all this for 90 days.  Dispense: 45 tablet; Refill: 2  -     ARIPiprazole (Abilify) 2 MG tablet; Take 1 tablet by mouth Daily for 30 days.  Dispense: 30 tablet; Refill:  0    2. Generalized anxiety disorder with panic attacks  -     lamoTRIgine (LaMICtal) 200 MG tablet; Take 0.5 tablets by mouth Every Morning Before Breakfast AND 1 tablet every night at bedtime. Do all this for 90 days.  Dispense: 45 tablet; Refill: 2  -     ARIPiprazole (Abilify) 2 MG tablet; Take 1 tablet by mouth Daily for 30 days.  Dispense: 30 tablet; Refill: 0    3. ADHD (attention deficit hyperactivity disorder), inattentive type         Vika Foley is a 29 y.o. female who presents today in follow up for management of major depressive disorder, generalized anxiety disorder and ADHD.    As detailed above patient appears to be doing well overall and currently denies any significant depressive or anxious symptoms as evidenced by current PHQ-9 and AUDI-7 scores.  She reports ongoing compliance with all psychiatric medications, denies any associated side effects, and appears to be tolerating these medications well.  Patient once again reports sustained improvement/stability in mood associated with ongoing improvement in anxiety and associated anxious symptoms.  She does once again report rather significant therapeutic benefit associated with her prescribed lamotrigine, but denies any distinctive improvement associated with her prescribed aripiprazole despite denying any significant side effects.  As such, the potential risk/benefits of tapering and discontinuing patient's prescribed aripiprazole were discussed in detail at today's visit with patient voicing her desire to begin tapering her prescribed aripiprazole with plans of eventually discontinuing this medication in the near future.  Patient was advised to decrease daily dose of aripiprazole to 2 mg p.o. nightly and to continue this dose until next visit in approximately 4 weeks for reassessment.  Patient voices understanding of this and is agreeable to this plan.    Medications:  -Continue lamotrigine 100 mg p.o. once daily each morning in addition to  lamotrigine 200 mg p.o. nightly for a daily total of lamotrigine 300 mg.  -Decrease aripiprazole to 2 mg p.o. once nightly at this time with plans to further discuss potentially discontinue this medication pending tolerance/response.  -Continue alprazolam 0.25 mg p.o. once daily as needed for acute anxiety prescribed previous psychiatric provider.  She has not utilized this medication in roughly 1 year due to adequate management of symptoms.    TREATMENT PLAN - SHORT AND LONG-TERM GOALS:   -Continue supportive psychotherapy efforts and medications as indicated. Treatment and medication options discussed during today's visit.   -Patient acknowledged and verbally consented to continue with current treatment plan and was educated on the importance of compliance with treatment and follow-up appointments.    SUMMARY/EDUCATION/DISCUSSION:  -Pt was given appropriate time to ask questions and concerns were addressed. A thorough discussion was had that included review of disease process, need for continued monitoring and additional treatment options including use of pharmacological and non-pharmacological approaches to care, decisions were made and agreed upon by patient and provider.   -Discussed medication options and treatment plan of prescribed medication as well as the risks, benefits, and side effects including potential falls, possible impaired driving and metabolic adversities among others; patient acknowledged and provided verbal consent.   -Patient has been educated regarding multimodal approach with healthy nutrition, healthy sleep, regular physical activity, social activities, counseling, and medications.  -Please call the office at [phone] within normal business hours (Monday-Friday, 8:00 AM - 4:30 PM) with any worsening of symptoms or onset of intolerable side effects. Please ask to leave a message with office staff.  Please allow up to 24-48 hours for response to a patient call/question/refill  request.  -Safety plan has been established and discussed in detail with the patient, who is agreeable to contact support system and/or call 911 or go to the nearest ER should he/she/they have any thoughts of harm to self or others.    MEDS ORDERED DURING VISIT:  New Medications Ordered This Visit   Medications    lamoTRIgine (LaMICtal) 200 MG tablet     Sig: Take 0.5 tablets by mouth Every Morning Before Breakfast AND 1 tablet every night at bedtime. Do all this for 90 days.     Dispense:  45 tablet     Refill:  2    ARIPiprazole (Abilify) 2 MG tablet     Sig: Take 1 tablet by mouth Daily for 30 days.     Dispense:  30 tablet     Refill:  0       FOLLOW UP:  Return in about 4 weeks (around 3/17/2025) for Next scheduled follow up.      Sebastián Moore DO    This document has been electronically signed by Sebastián Moore DO  February 17, 2025 11:43 EST    Part of this note may be an electronic transcription/translation of spoken language to printed text using the Dragon Dictation System. Some of the data in this electronic note has been brought forward from a previous encounter, any necessary changes have been made, it has been reviewed by this provider, and it is accurate.    Answers submitted by the patient for this visit:  Problem not listed (Submitted on 2/15/2025)  Chief Complaint: Other medical problem  Reason for appointment: Med check  anorexia: No  joint pain: No  change in stool: No  headaches: No  joint swelling: No  vertigo: No  visual change: No

## 2025-03-17 ENCOUNTER — OFFICE VISIT (OUTPATIENT)
Age: 30
End: 2025-03-17
Payer: COMMERCIAL

## 2025-03-17 VITALS
DIASTOLIC BLOOD PRESSURE: 79 MMHG | BODY MASS INDEX: 30 KG/M2 | SYSTOLIC BLOOD PRESSURE: 109 MMHG | HEIGHT: 62 IN | HEART RATE: 86 BPM | OXYGEN SATURATION: 98 % | WEIGHT: 163 LBS

## 2025-03-17 DIAGNOSIS — F41.0 GENERALIZED ANXIETY DISORDER WITH PANIC ATTACKS: ICD-10-CM

## 2025-03-17 DIAGNOSIS — F33.1 MDD (MAJOR DEPRESSIVE DISORDER), RECURRENT EPISODE, MODERATE: Primary | ICD-10-CM

## 2025-03-17 DIAGNOSIS — F90.0 ADHD (ATTENTION DEFICIT HYPERACTIVITY DISORDER), INATTENTIVE TYPE: ICD-10-CM

## 2025-03-17 DIAGNOSIS — F41.1 GENERALIZED ANXIETY DISORDER WITH PANIC ATTACKS: ICD-10-CM

## 2025-03-17 NOTE — PROGRESS NOTES
Subjective   Vika Foley is a 29 y.o. female who presents today in follow up for management of major depressive disorder, generalized anxiety disorder, and ADHD.    Patient once again reports that she is doing well overall and endorses ongoing stability in mood associated with current medication regimen.  She reports consistent compliance with her lamotrigine and aripiprazole, denies any associated side effects, and appears to be tolerating his medications well.  More specifically, patient denies a depressed mood and reports complete resolution of anhedonia, improved sleep, mildly improved levels of energy, improved self worth, improved ability to sustain her attention/concentration, and resolution of noticeable psychomotor retardation.  Patient denies experiencing any suicidal ideation, intent or plan.  She does continue to endorse high levels of anxiety but reports improved ability to control/stop her anxious symptoms.  She also reports decreased frequency of catastrophizing/thinking worst case scenarios as well as decreased frequency of feeling as if something bad or awful is going to happen.  She does continue to report adequate management of her ADHD symptoms at this time. Patient otherwise denies any auditory, visual, or tactile hallucinations and does not currently appear to be responding to internal stimuli.  Patient denies any generalized paranoia and displays no evidence of delusional thinking.    The following portions of the patient's history were reviewed and updated as appropriate: allergies, current medications, past family history, past medical history, past social history, past surgical history and problem list.       Past Medical History:  Past Medical History:   Diagnosis Date    ADHD (attention deficit hyperactivity disorder) 2020    was tested for it, but never treated for it    Anxiety     Depression     Migraine teenage years    Obsessive-compulsive disorder 2023    diagnosed at the couch  "   Panic attacks     Self-injurious behavior        Social History:  Social History     Socioeconomic History    Marital status: Single    Number of children: 0    Highest education level: Bachelor's degree (e.g., BA, AB, BS)   Tobacco Use    Smoking status: Never    Smokeless tobacco: Never   Vaping Use    Vaping status: Never Used   Substance and Sexual Activity    Alcohol use: Yes     Comment: Drink occasionally    Drug use: Not Currently     Types: Marijuana     Comment: used to smoke regularly, have quit    Sexual activity: Not Currently     Partners: Female     Birth control/protection: I.U.D.     Comment: Mirena IUD       Family History:  Family History   Problem Relation Age of Onset    Diabetes Mother         pre-diabetic    Thyroid disease Father         thyroid \"issue\" per patient    ADD / ADHD Sister     ADD / ADHD Sister     Anxiety disorder Sister     Diabetes Maternal Grandfather     Alzheimer's disease Paternal Grandfather        Past Surgical History:  Past Surgical History:   Procedure Laterality Date    TONSILLECTOMY      WISDOM TOOTH EXTRACTION         Problem List:  Patient Active Problem List   Diagnosis    MDD (major depressive disorder), recurrent episode, moderate    Generalized anxiety disorder with panic attacks    Mixed obsessional thoughts and acts    ADHD (attention deficit hyperactivity disorder), inattentive type    Insomnia due to medical condition       Allergy:   No Known Allergies     Current Medications:   Current Outpatient Medications   Medication Sig Dispense Refill    ALPRAZolam (Xanax) 0.25 MG tablet       lamoTRIgine (LaMICtal) 200 MG tablet Take 0.5 tablets by mouth Every Morning Before Breakfast AND 1 tablet every night at bedtime. Do all this for 90 days. 45 tablet 2    Levonorgestrel (MIRENA) 20 MCG/DAY intrauterine device IUD To be inserted one time by prescriber. Route intrauterine.      Vitamin D, Cholecalciferol, 50 MCG (2000 UT) capsule Take  by mouth.       No " "current facility-administered medications for this visit.       Review of Symptoms:    Review of Systems   Constitutional:  Negative for chills, diaphoresis, fatigue and fever.   HENT:  Negative for congestion, sore throat and swollen glands.    Respiratory:  Negative for cough.    Cardiovascular:  Negative for chest pain.   Gastrointestinal:  Negative for abdominal pain, nausea and vomiting.   Genitourinary:  Negative for dysuria.   Musculoskeletal:  Negative for myalgias and neck pain.   Skin:  Negative for rash.   Neurological:  Negative for weakness and numbness.   Psychiatric/Behavioral:  Positive for decreased concentration and depressed mood. Negative for hallucinations, self-injury, sleep disturbance and suicidal ideas. The patient is nervous/anxious.          Physical Exam:   Blood pressure 109/79, pulse 86, height 157.5 cm (62.01\"), weight 73.9 kg (163 lb), SpO2 98%, not currently breastfeeding.  Appearance: Appears documented age, appropriate hygiene and grooming.  Gait, Station, Strength: Normal gait, station and strength.       Mental Status Exam:   Hygiene:   good  Cooperation:  Cooperative  Eye Contact:  Good  Psychomotor Behavior:  Appropriate  Affect:  Full range and Appropriate  Mood: normal and euthymic  Hopelessness: Denies  Speech:  Normal  Thought Process:  Goal directed and Linear  Thought Content:  Normal  Suicidal:  None  Homicidal:  None  Hallucinations:  None  Delusion:  None  Memory:  Intact  Orientation:  Person, Place, Time, and Situation  Reliability:  good  Insight:  Good  Judgement:  Good  Impulse Control:  Good      Lab Results:   No visits with results within 1 Month(s) from this visit.   Latest known visit with results is:   Office Visit on 01/15/2025   Component Date Value Ref Range Status    Diagnosis 01/15/2025 Comment   Final    Comment: NEGATIVE FOR INTRAEPITHELIAL LESION OR MALIGNANCY.  THIS SPECIMEN WAS RESCREENED AS PART OF OUR  PROGRAM.      Specimen " adequacy: 01/15/2025 Comment   Final    Comment: Satisfactory for evaluation.  Endocervical and/or squamous metaplastic  cells (endocervical component) are present.      Clinician Provided ICD-10: 01/15/2025 Comment   Final    Z01.419    Performed by: 01/15/2025 Comment   Final    Antoinette Sams, Cytotechnologist ([facility])    QC reviewed by: 01/15/2025 Comment   Final    Kathie Mendosa, Cytotechnologist ([facility])    . 01/15/2025 .   Final    Note: 01/15/2025 Comment   Final    Comment: The Pap smear is a screening test designed to aid in the detection of  premalignant and malignant conditions of the uterine cervix.  It is not a  diagnostic procedure and should not be used as the sole means of detecting  cervical cancer.  Both false-positive and false-negative reports do occur.      Method: 01/15/2025 Comment   Final    Comment: This liquid based ThinPrep(R) pap test was screened with the  use of an image guided system.      Conv .conv 01/15/2025 Comment   Final    Comment: The HPV DNA reflex criteria were not met with this specimen result  therefore, no HPV testing was performed.         PHQ-9 Total Score: 7    AUDI-7 Total Score: 6     Assessment & Plan    Diagnoses and all orders for this visit:    1. MDD (major depressive disorder), recurrent episode, moderate (Primary)    2. Generalized anxiety disorder with panic attacks    3. ADHD (attention deficit hyperactivity disorder), inattentive type         Vika Foley is a 29 y.o. female who presents today in follow up for management of major depressive disorder, generalized anxiety disorder, and ADHD.    As detailed above patient appears to be doing well overall and once again endorses ongoing improvement in numerous depressive and anxious symptoms as evidenced by current PHQ-9 and AUDI-7 scores.  Patient reports ongoing compliance with all psychiatric medications, denies any associated side effects, and appears to be tolerating these medications well.  She denies any  exacerbation of symptoms following further decrease in daily aripiprazole and once again voices her belief that this medication has not significantly aided in addressing her psychiatric symptoms.  As such I do recommend discontinuing adjunctive aripiprazole at this time given lack of therapeutic benefit associated with multiple side effects at higher dosages as detailed in previous documentation.  I do recommend continuation of lamotrigine at this time given positive therapeutic benefit associated with this medication in the absence of any significant side effects.  Also recommend continued compliance with psychotherapy as patient has seen her established therapist, Cleo meza approximately twice per month.  Otherwise, patient will be seen again in approximately 6 weeks for reassessment.  She voices understanding of this and is agreeable to today's plan.    Medications:  -Continue lamotrigine 100 mg p.o. once daily each morning in conjunction with lamotrigine 200 mg p.o. nightly for daily total of lamotrigine 300 mg.  -Discontinue her Provigil 2 mg p.o. once daily at this time given lack of therapeutic benefit associated with this medication.  -Continue alprazolam 0.25 mg p.o. once daily as needed for acute anxiety as prescribed her previous psychiatric provider.  Patient has not utilized this medication in over 1 year due to adequate management of symptoms.    TREATMENT PLAN - SHORT AND LONG-TERM GOALS:   -Continue supportive psychotherapy efforts and medications as indicated. Treatment and medication options discussed during today's visit.   -Patient acknowledged and verbally consented to continue with current treatment plan and was educated on the importance of compliance with treatment and follow-up appointments.    SUMMARY/EDUCATION/DISCUSSION:  -Pt was given appropriate time to ask questions and concerns were addressed. A thorough discussion was had that included review of disease process, need for continued  monitoring and additional treatment options including use of pharmacological and non-pharmacological approaches to care, decisions were made and agreed upon by patient and provider.   -Discussed medication options and treatment plan of prescribed medication as well as the risks, benefits, and side effects including potential falls, possible impaired driving and metabolic adversities among others; patient acknowledged and provided verbal consent.   -Patient has been educated regarding multimodal approach with healthy nutrition, healthy sleep, regular physical activity, social activities, counseling, and medications.  -Please call the office at [phone] within normal business hours (Monday-Friday, 8:00 AM - 4:30 PM) with any worsening of symptoms or onset of intolerable side effects. Please ask to leave a message with office staff.  Please allow up to 24-48 hours for response to a patient call/question/refill request.  -Safety plan has been established and discussed in detail with the patient, who is agreeable to contact support system and/or call 911 or go to the nearest ER should he/she/they have any thoughts of harm to self or others.    MEDS ORDERED DURING VISIT:  No orders of the defined types were placed in this encounter.      FOLLOW UP:  Return in about 6 weeks (around 4/28/2025) for Next scheduled follow up.      Sebastián Moore DO    This document has been electronically signed by Sebastián Moore DO  March 17, 2025 14:47 EDT    Part of this note may be an electronic transcription/translation of spoken language to printed text using the Dragon Dictation System. Some of the data in this electronic note has been brought forward from a previous encounter, any necessary changes have been made, it has been reviewed by this provider, and it is accurate.    Answers submitted by the patient for this visit:  Problem not listed (Submitted on 3/16/2025)  Chief Complaint: Other medical problem  Reason for  appointment: Medicine check  anorexia: No  joint pain: No  change in stool: No  headaches: No  joint swelling: No  vertigo: No  visual change: No

## 2025-04-02 ENCOUNTER — TELEPHONE (OUTPATIENT)
Age: 30
End: 2025-04-02
Payer: COMMERCIAL

## 2025-04-02 DIAGNOSIS — F33.1 MDD (MAJOR DEPRESSIVE DISORDER), RECURRENT EPISODE, MODERATE: Primary | ICD-10-CM

## 2025-04-02 DIAGNOSIS — F41.1 GENERALIZED ANXIETY DISORDER WITH PANIC ATTACKS: ICD-10-CM

## 2025-04-02 DIAGNOSIS — F41.0 GENERALIZED ANXIETY DISORDER WITH PANIC ATTACKS: ICD-10-CM

## 2025-04-02 DIAGNOSIS — F33.1 MDD (MAJOR DEPRESSIVE DISORDER), RECURRENT EPISODE, MODERATE: ICD-10-CM

## 2025-04-02 RX ORDER — ARIPIPRAZOLE 2 MG/1
2 TABLET ORAL DAILY
Qty: 30 TABLET | Refills: 0 | Status: SHIPPED | OUTPATIENT
Start: 2025-04-02

## 2025-04-02 NOTE — TELEPHONE ENCOUNTER
Patient was contacted and we discussed moving up her neck scheduled appointment to 4/18/2025 at 8 AM.  Patient was also advised to reinitiate aripiprazole 2 mg p.o. once daily at this time in hopes of addressing the recurrence of her anxious symptoms and irritability.  A 30-day supply will be sent to patient's pharmacy.

## 2025-04-02 NOTE — TELEPHONE ENCOUNTER
Pt called and was wanting to move her appt from 4/28 up due to having feelings of severe anxiety, feeling agitated, angry, emotionally unwell and tearful. not being suicidal but heading that way and this is due to coming off the Abilify under Dr. Moore supervision. PT says she still has some Abilify if you would like for to start it back up to maybe help with these symptoms, I did advise pt if she does begin to feel suicidal or homicidal she needs to go to the ER of call 911    She is requesting a call back, a good call back number is [phone]

## 2025-04-03 RX ORDER — LAMOTRIGINE 200 MG/1
TABLET ORAL
Qty: 45 TABLET | Refills: 2 | Status: SHIPPED | OUTPATIENT
Start: 2025-04-03 | End: 2025-07-02

## 2025-04-03 NOTE — TELEPHONE ENCOUNTER
Rx Refill Note  Requested Prescriptions     Pending Prescriptions Disp Refills    lamoTRIgine (LaMICtal) 200 MG tablet 45 tablet 2     Sig: Take 0.5 tablets by mouth Every Morning Before Breakfast AND 1 tablet every night at bedtime. Do all this for 90 days.      Last office visit with prescribing clinician: 3/17/2025   Last telemedicine visit with prescribing clinician: Visit date not found   Next office visit with prescribing clinician: 4/18/2025     Abelino Pandya MA  04/03/25, 16:00 EDT

## 2025-04-18 ENCOUNTER — OFFICE VISIT (OUTPATIENT)
Age: 30
End: 2025-04-18
Payer: COMMERCIAL

## 2025-04-18 VITALS
HEIGHT: 62 IN | BODY MASS INDEX: 29.44 KG/M2 | OXYGEN SATURATION: 98 % | SYSTOLIC BLOOD PRESSURE: 108 MMHG | WEIGHT: 160 LBS | HEART RATE: 89 BPM | DIASTOLIC BLOOD PRESSURE: 56 MMHG

## 2025-04-18 DIAGNOSIS — F41.0 GENERALIZED ANXIETY DISORDER WITH PANIC ATTACKS: ICD-10-CM

## 2025-04-18 DIAGNOSIS — F41.1 GENERALIZED ANXIETY DISORDER WITH PANIC ATTACKS: ICD-10-CM

## 2025-04-18 DIAGNOSIS — F90.0 ADHD (ATTENTION DEFICIT HYPERACTIVITY DISORDER), INATTENTIVE TYPE: ICD-10-CM

## 2025-04-18 DIAGNOSIS — F33.1 MDD (MAJOR DEPRESSIVE DISORDER), RECURRENT EPISODE, MODERATE: Primary | ICD-10-CM

## 2025-04-18 RX ORDER — ARIPIPRAZOLE 2 MG/1
2 TABLET ORAL DAILY
Qty: 30 TABLET | Refills: 2 | Status: SHIPPED | OUTPATIENT
Start: 2025-04-18

## 2025-04-18 NOTE — PROGRESS NOTES
Subjective   Vika Foley is a 29 y.o. female who presents today in follow up for management of depressive disorder, generalized anxiety disorder and ADHD.    Patient reports that she is doing well overall and states that she does feel as if she has returned to her baseline after reinitiation of aripiprazole at the beginning of this month.  It is of note that we did attempt to discontinue patient's aripiprazole at last visit resulting in a recurrence/exacerbation of anxious and depressive symptoms as detailed in telephone note from 4/2/2025.  Since reinitiation of aripiprazole patient does report a significant improvement in mood associated with near resolution of anhedonia, improved levels of energy, improved sleep overall and improved self worth.  Patient denies experiencing any active suicidal ideation, intent or plan.  She also reports significantly decreased levels of anxiety associated with improved ability to control/manage her anxious symptoms.  She continues to report feelings of restlessness and irritability but does endorse decreased ovarian the symptoms when compared to clinical status several weeks ago.  Patient does once again feel as if her ADHD symptoms are appropriately managed at this time.  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 Patient otherwise denies any auditory, visual, or tactile hallucinations and does not currently appear to be responding to internal stimuli.  Patient denies any generalized paranoia and displays no evidence of delusional thinking.    The following portions of the patient's history were reviewed and updated as appropriate: allergies, current medications, past family history, past medical history, past social history, past surgical history and problem list.       Past Medical History:  Past Medical History:   Diagnosis Date    ADHD (attention  "deficit hyperactivity disorder) 2020    was tested for it, but never treated for it    Anxiety     Depression     Migraine teenage years    Obsessive-compulsive disorder 2023    diagnosed at the couch    Panic attacks     Self-injurious behavior        Social History:  Social History     Socioeconomic History    Marital status: Single    Number of children: 0    Highest education level: Bachelor's degree (e.g., BA, AB, BS)   Tobacco Use    Smoking status: Never    Smokeless tobacco: Never   Vaping Use    Vaping status: Never Used   Substance and Sexual Activity    Alcohol use: Yes     Comment: Drink occasionally    Drug use: Not Currently     Types: Marijuana     Comment: used to smoke regularly, have quit    Sexual activity: Not Currently     Partners: Female     Birth control/protection: I.U.D.     Comment: Mirena IUD       Family History:  Family History   Problem Relation Age of Onset    Diabetes Mother         pre-diabetic    Thyroid disease Father         thyroid \"issue\" per patient    ADD / ADHD Sister     ADD / ADHD Sister     Anxiety disorder Sister     Diabetes Maternal Grandfather     Alzheimer's disease Paternal Grandfather        Past Surgical History:  Past Surgical History:   Procedure Laterality Date    TONSILLECTOMY      WISDOM TOOTH EXTRACTION         Problem List:  Patient Active Problem List   Diagnosis    MDD (major depressive disorder), recurrent episode, moderate    Generalized anxiety disorder with panic attacks    Mixed obsessional thoughts and acts    ADHD (attention deficit hyperactivity disorder), inattentive type    Insomnia due to medical condition       Allergy:   No Known Allergies     Current Medications:   Current Outpatient Medications   Medication Sig Dispense Refill    ARIPiprazole (ABILIFY) 2 MG tablet Take 1 tablet by mouth Daily. 30 tablet 2    ALPRAZolam (Xanax) 0.25 MG tablet       lamoTRIgine (LaMICtal) 200 MG tablet Take 0.5 tablets by mouth Every Morning Before Breakfast " "AND 1 tablet every night at bedtime. Do all this for 90 days. 45 tablet 2    Levonorgestrel (MIRENA) 20 MCG/DAY intrauterine device IUD To be inserted one time by prescriber. Route intrauterine.      Vitamin D, Cholecalciferol, 50 MCG (2000 UT) capsule Take  by mouth.       No current facility-administered medications for this visit.       Review of Symptoms:    Review of Systems   Constitutional:  Positive for activity change and fatigue.   HENT:  Negative for tinnitus.    Endocrine: Negative for cold intolerance and heat intolerance.   Skin:  Negative for rash.   Neurological:  Negative for seizures and confusion.   Psychiatric/Behavioral:  Positive for decreased concentration, sleep disturbance and depressed mood. Negative for hallucinations, self-injury and suicidal ideas. The patient is nervous/anxious.          Physical Exam:   Blood pressure 108/56, pulse 89, height 157.5 cm (62.01\"), weight 72.6 kg (160 lb), SpO2 98%, not currently breastfeeding.  Appearance: Appears documented age, appropriate hygiene and grooming.  Gait, Station, Strength: Normal gait, station and strength.       Mental Status Exam:   Hygiene:   good  Cooperation:  Cooperative  Eye Contact:  Good  Psychomotor Behavior:  Appropriate  Affect:  Full range and Appropriate  Mood: normal and euthymic  Hopelessness: Denies  Speech:  Normal  Thought Process:  Goal directed and Linear  Thought Content:  Normal  Suicidal:  None  Homicidal:  None  Hallucinations:  None  Delusion:  None  Memory:  Intact  Orientation:  Person, Place, Time, and Situation  Reliability:  good  Insight:  Good  Judgement:  Good  Impulse Control:  Good      Lab Results:   No visits with results within 1 Month(s) from this visit.   Latest known visit with results is:   Office Visit on 01/15/2025   Component Date Value Ref Range Status    Diagnosis 01/15/2025 Comment   Final    Comment: NEGATIVE FOR INTRAEPITHELIAL LESION OR MALIGNANCY.  THIS SPECIMEN WAS RESCREENED AS PART OF " OUR  PROGRAM.      Specimen adequacy: 01/15/2025 Comment   Final    Comment: Satisfactory for evaluation.  Endocervical and/or squamous metaplastic  cells (endocervical component) are present.      Clinician Provided ICD-10: 01/15/2025 Comment   Final    Z01.419    Performed by: 01/15/2025 Comment   Final    Antoinette Sams, Cytotechnologist ([facility])    QC reviewed by: 01/15/2025 Comment   Final    Kathie Mendosa, Cytotechnologist ([facility])    . 01/15/2025 .   Final    Note: 01/15/2025 Comment   Final    Comment: The Pap smear is a screening test designed to aid in the detection of  premalignant and malignant conditions of the uterine cervix.  It is not a  diagnostic procedure and should not be used as the sole means of detecting  cervical cancer.  Both false-positive and false-negative reports do occur.      Method: 01/15/2025 Comment   Final    Comment: This liquid based ThinPrep(R) pap test was screened with the  use of an image guided system.      Conv .conv 01/15/2025 Comment   Final    Comment: The HPV DNA reflex criteria were not met with this specimen result  therefore, no HPV testing was performed.         PHQ-9 Total Score: 7    AUDI-7 Total Score: 7     Assessment & Plan    Diagnoses and all orders for this visit:    1. MDD (major depressive disorder), recurrent episode, moderate (Primary)  -     ARIPiprazole (ABILIFY) 2 MG tablet; Take 1 tablet by mouth Daily.  Dispense: 30 tablet; Refill: 2    2. Generalized anxiety disorder with panic attacks  -     ARIPiprazole (ABILIFY) 2 MG tablet; Take 1 tablet by mouth Daily.  Dispense: 30 tablet; Refill: 2    3. ADHD (attention deficit hyperactivity disorder), inattentive type         Vika Foley is a 29 y.o. female who presents today in follow up for management of major depressive disorder, generalized anxiety disorder and ADHD.    As detailed above patient reports a significant improvement in symptoms following reinitiation of low-dose  aripiprazole at the beginning of this month.  She reports a significant improvement in numerous depressive and anxious symptoms as evidenced by current PHQ-9 and AUDI-7 scores.  Patient does report consistent compliance with her prescribed lamotrigine and aripiprazole, denies any associated side effects and appears to be tolerating these medications well.  It was discussed with the patient at today's visit that it does appear as if we will need to continue patient's aripiprazole at this time for appropriate symptom management given exacerbation/recurrence of anxious and depressive symptoms upon her recent attempt of discontinuation.  Patient has been prescribed this medication for several years at this point and we will potentially explore tapering/discontinuing this medication in the future if necessary upon prolonged management of patient's psychiatric symptoms.  As such I do recommend continuing both lamotrigine and aripiprazole at this time.  Patient will be seen again here in the office in approximately 9 weeks for reassessment.  She voices understanding of this and is agreeable to today's plan.      Medications:  -Continue lamotrigine 100 mg p.o. once daily in conjunction with lamotrigine 200 mg p.o. nightly for daily total of lamotrigine 300 mg.  - Continue aripiprazole 2 mg p.o. once daily at this time.    TREATMENT PLAN - SHORT AND LONG-TERM GOALS:   -Continue supportive psychotherapy efforts and medications as indicated. Treatment and medication options discussed during today's visit.   -Patient acknowledged and verbally consented to continue with current treatment plan and was educated on the importance of compliance with treatment and follow-up appointments.    SUMMARY/EDUCATION/DISCUSSION:  -Pt was given appropriate time to ask questions and concerns were addressed. A thorough discussion was had that included review of disease process, need for continued monitoring and additional treatment options  including use of pharmacological and non-pharmacological approaches to care, decisions were made and agreed upon by patient and provider.   -Discussed medication options and treatment plan of prescribed medication as well as the risks, benefits, and side effects including potential falls, possible impaired driving and metabolic adversities among others; patient acknowledged and provided verbal consent.   -Patient has been educated regarding multimodal approach with healthy nutrition, healthy sleep, regular physical activity, social activities, counseling, and medications.  -Please call the office at [phone] within normal business hours (Monday-Friday, 8:00 AM - 4:30 PM) with any worsening of symptoms or onset of intolerable side effects. Please ask to leave a message with office staff.  Please allow up to 24-48 hours for response to a patient call/question/refill request.  -Safety plan has been established and discussed in detail with the patient, who is agreeable to contact support system and/or call 911 or go to the nearest ER should he/she/they have any thoughts of harm to self or others.    MEDS ORDERED DURING VISIT:  New Medications Ordered This Visit   Medications    ARIPiprazole (ABILIFY) 2 MG tablet     Sig: Take 1 tablet by mouth Daily.     Dispense:  30 tablet     Refill:  2       FOLLOW UP:  Return in about 9 weeks (around 6/20/2025) for Next scheduled follow up.      Sebastián Moore DO    This document has been electronically signed by Sebastián Moore DO  April 18, 2025 08:24 EDT    Part of this note may be an electronic transcription/translation of spoken language to printed text using the Dragon Dictation System. Some of the data in this electronic note has been brought forward from a previous encounter, any necessary changes have been made, it has been reviewed by this provider, and it is accurate.    Answers submitted by the patient for this visit:  Problem not listed (Submitted on  4/17/2025)  Chief Complaint: Other medical problem  Reason for appointment: Medicine check

## 2025-05-05 DIAGNOSIS — F41.1 GENERALIZED ANXIETY DISORDER WITH PANIC ATTACKS: ICD-10-CM

## 2025-05-05 DIAGNOSIS — F33.1 MDD (MAJOR DEPRESSIVE DISORDER), RECURRENT EPISODE, MODERATE: ICD-10-CM

## 2025-05-05 DIAGNOSIS — F41.0 GENERALIZED ANXIETY DISORDER WITH PANIC ATTACKS: ICD-10-CM

## 2025-05-05 RX ORDER — LAMOTRIGINE 200 MG/1
TABLET ORAL
Qty: 45 TABLET | Refills: 2 | Status: SHIPPED | OUTPATIENT
Start: 2025-05-05 | End: 2025-08-03

## 2025-05-05 RX ORDER — ARIPIPRAZOLE 2 MG/1
2 TABLET ORAL DAILY
Qty: 30 TABLET | Refills: 2 | Status: SHIPPED | OUTPATIENT
Start: 2025-05-05

## 2025-06-20 ENCOUNTER — OFFICE VISIT (OUTPATIENT)
Age: 30
End: 2025-06-20
Payer: COMMERCIAL

## 2025-06-20 VITALS
DIASTOLIC BLOOD PRESSURE: 71 MMHG | BODY MASS INDEX: 29.26 KG/M2 | SYSTOLIC BLOOD PRESSURE: 103 MMHG | HEART RATE: 91 BPM | HEIGHT: 62 IN

## 2025-06-20 DIAGNOSIS — F33.1 MDD (MAJOR DEPRESSIVE DISORDER), RECURRENT EPISODE, MODERATE: Primary | ICD-10-CM

## 2025-06-20 DIAGNOSIS — F41.1 GENERALIZED ANXIETY DISORDER WITH PANIC ATTACKS: ICD-10-CM

## 2025-06-20 DIAGNOSIS — F90.0 ADHD (ATTENTION DEFICIT HYPERACTIVITY DISORDER), INATTENTIVE TYPE: ICD-10-CM

## 2025-06-20 DIAGNOSIS — F41.0 GENERALIZED ANXIETY DISORDER WITH PANIC ATTACKS: ICD-10-CM

## 2025-06-20 NOTE — PROGRESS NOTES
Subjective   Vika Foley is a 30 y.o. female who presents today in follow up for management of major depressive disorder, generalized anxiety disorder and ADHD.    Patient reports that she is doing well overall and once again endorses sustained improvement in psychiatric symptoms associated with current medication regimen.  More specifically she currently denies a depressed mood associated with increased levels of energy, improved sleep, improved self worth, decreased feelings of guilt, resolution of psychomotor retardation, and resolution of anhedonia.  Patient currently denies any active suicidal ideation, intent or plan.  She does also report decreased levels of anxiety overall associated with improved ability to control/manage her anxiety and worry.  She also reports decreased feelings of restlessness and states that she has not been catastrophizing or thinking worst case scenarios nearly as frequently.  Patient also reports adequate management of ADHD symptoms at this time.  She reports consistent compliance with her psychiatric medications, denies any associated side effects and appears to be tolerating these medications well. Patient otherwise denies any auditory, visual, or tactile hallucinations and does not currently appear to be responding to internal stimuli.  Patient denies any generalized paranoia and displays no evidence of delusional thinking.    The following portions of the patient's history were reviewed and updated as appropriate: allergies, current medications, past family history, past medical history, past social history, past surgical history and problem list.  History of Present Illness               Past Medical History:  Past Medical History:   Diagnosis Date    ADHD (attention deficit hyperactivity disorder) 2020    was tested for it, but never treated for it    Anxiety     Depression     Migraine teenage years    Obsessive-compulsive disorder 2023    diagnosed at the couch    Panic  "attacks     Self-injurious behavior        Social History:  Social History     Socioeconomic History    Marital status: Single    Number of children: 0    Highest education level: Bachelor's degree (e.g., BA, AB, BS)   Tobacco Use    Smoking status: Never    Smokeless tobacco: Never   Vaping Use    Vaping status: Never Used   Substance and Sexual Activity    Alcohol use: Yes     Comment: Drink occasionally    Drug use: Not Currently     Types: Marijuana     Comment: used to smoke regularly, have quit    Sexual activity: Not Currently     Partners: Female     Birth control/protection: I.U.D.     Comment: Mirena IUD       Family History:  Family History   Problem Relation Age of Onset    Diabetes Mother         pre-diabetic    Thyroid disease Father         thyroid \"issue\" per patient    ADD / ADHD Sister     ADD / ADHD Sister     Anxiety disorder Sister     Diabetes Maternal Grandfather     Alzheimer's disease Paternal Grandfather        Past Surgical History:  Past Surgical History:   Procedure Laterality Date    TONSILLECTOMY      WISDOM TOOTH EXTRACTION         Problem List:  Patient Active Problem List   Diagnosis    MDD (major depressive disorder), recurrent episode, moderate    Generalized anxiety disorder with panic attacks    Mixed obsessional thoughts and acts    ADHD (attention deficit hyperactivity disorder), inattentive type    Insomnia due to medical condition       Allergy:   No Known Allergies     Current Medications:   Current Outpatient Medications   Medication Sig Dispense Refill    ALPRAZolam (Xanax) 0.25 MG tablet       ARIPiprazole (ABILIFY) 2 MG tablet Take 1 tablet by mouth Daily. 30 tablet 2    lamoTRIgine (LaMICtal) 200 MG tablet Take 0.5 tablets by mouth Every Morning Before Breakfast AND 1 tablet every night at bedtime. Do all this for 90 days. 45 tablet 2    Levonorgestrel (MIRENA) 20 MCG/DAY intrauterine device IUD To be inserted one time by prescriber. Route intrauterine.      Vitamin D, " "Cholecalciferol, 50 MCG (2000 UT) capsule Take  by mouth.       No current facility-administered medications for this visit.       Review of Symptoms:    Review of Systems   Constitutional:  Negative for activity change and fatigue.   HENT:  Negative for tinnitus.    Eyes:  Negative for visual disturbance.   Endocrine: Negative for cold intolerance and heat intolerance.   Psychiatric/Behavioral:  Negative for decreased concentration, self-injury, sleep disturbance, suicidal ideas, negative for hyperactivity and depressed mood. The patient is nervous/anxious.          Physical Exam:   Blood pressure 103/71, pulse 91, height 157.5 cm (62\"), not currently breastfeeding.  Appearance: Appears documented age, appropriate hygiene and grooming.  Gait, Station, Strength: Normal gait, station and strength.  Physical Exam               Mental Status Exam:   Hygiene:   good  Cooperation:  Cooperative  Eye Contact:  Good  Psychomotor Behavior:  Appropriate  Affect:  Full range and Appropriate  Mood: normal and euthymic  Hopelessness: Denies  Speech:  Normal  Thought Process:  Goal directed and Linear  Thought Content:  Normal  Suicidal:  None  Homicidal:  None  Hallucinations:  None  Delusion:  None  Memory:  Intact  Orientation:  Person, Place, Time, and Situation  Reliability:  good  Insight:  Good  Judgement:  Good  Impulse Control:  Good      Lab Results:   No visits with results within 1 Month(s) from this visit.   Latest known visit with results is:   Office Visit on 01/15/2025   Component Date Value Ref Range Status    Diagnosis 01/15/2025 Comment   Final    Comment: NEGATIVE FOR INTRAEPITHELIAL LESION OR MALIGNANCY.  THIS SPECIMEN WAS RESCREENED AS PART OF OUR  PROGRAM.      Specimen adequacy: 01/15/2025 Comment   Final    Comment: Satisfactory for evaluation.  Endocervical and/or squamous metaplastic  cells (endocervical component) are present.      Clinician Provided ICD-10: 01/15/2025 Comment   Final    " Z01.419    Performed by: 01/15/2025 Comment   Final    Antoinette Sams, Cytotechnologist ([facility])    QC reviewed by: 01/15/2025 Comment   Final    Kathie Mendosa, Cytotechnologist ([facility])    . 01/15/2025 .   Final    Note: 01/15/2025 Comment   Final    Comment: The Pap smear is a screening test designed to aid in the detection of  premalignant and malignant conditions of the uterine cervix.  It is not a  diagnostic procedure and should not be used as the sole means of detecting  cervical cancer.  Both false-positive and false-negative reports do occur.      Method: 01/15/2025 Comment   Final    Comment: This liquid based ThinPrep(R) pap test was screened with the  use of an image guided system.      Conv .conv 01/15/2025 Comment   Final    Comment: The HPV DNA reflex criteria were not met with this specimen result  therefore, no HPV testing was performed.       Results            PHQ-9 Total Score: 4    AUDI-7 Total Score: 3     Assessment & Plan    Diagnoses and all orders for this visit:    1. MDD (major depressive disorder), recurrent episode, moderate (Primary)    2. Generalized anxiety disorder with panic attacks    3. ADHD (attention deficit hyperactivity disorder), inattentive type         Vika Foley is a 30 y.o. female who presents today in follow up for management of major depressive disorder, generalized anxiety disorder and ADHD.    As detailed above patient appears to be doing well overall and does once again endorses stained improvement in psychiatric symptoms associated with current medication regimen.  She reports consistent compliance with all of her psychiatric medications, denies any associated side effects and appears to be tolerating his medications well.  Patient reports a rather significant improvement in depressive and anxious symptoms as evidenced by current PHQ-9 and AUDI-7 scores.  As such I recommend making no medication adjustments at this time and did once again encourage ongoing  compliance with psychiatric medications at today's visit.  Otherwise, patient will be seen again in approximately 12 weeks for reassessment.  She voices understanding of this and is agreeable to today's plan.    Medications:  -Continue lamotrigine 100 mg p.o. once daily conjunction with lamotrigine 200 mg p.o. nightly for daily total of lamotrigine 300 mg.  - Continue aripiprazole 2 mg p.o. once daily at this time.    TREATMENT PLAN - SHORT AND LONG-TERM GOALS:   -Continue supportive psychotherapy efforts and medications as indicated. Treatment and medication options discussed during today's visit.   -Patient acknowledged and verbally consented to continue with current treatment plan and was educated on the importance of compliance with treatment and follow-up appointments.    SUMMARY/EDUCATION/DISCUSSION:  -Pt was given appropriate time to ask questions and concerns were addressed. A thorough discussion was had that included review of disease process, need for continued monitoring and additional treatment options including use of pharmacological and non-pharmacological approaches to care, decisions were made and agreed upon by patient and provider.   -Discussed medication options and treatment plan of prescribed medication as well as the risks, benefits, and side effects including potential falls, possible impaired driving and metabolic adversities among others; patient acknowledged and provided verbal consent.   -Patient has been educated regarding multimodal approach with healthy nutrition, healthy sleep, regular physical activity, social activities, counseling, and medications.  -Please call the office at [phone] within normal business hours (Monday-Friday, 8:00 AM - 4:30 PM) with any worsening of symptoms or onset of intolerable side effects. Please ask to leave a message with office staff.  Please allow up to 24-48 hours for response to a patient call/question/refill request.  -Safety plan has been  established and discussed in detail with the patient, who is agreeable to contact support system and/or call 911 or go to the nearest ER should he/she/they have any thoughts of harm to self or others.    MEDS ORDERED DURING VISIT:  No orders of the defined types were placed in this encounter.      FOLLOW UP:  Return in about 12 weeks (around 9/12/2025) for Next scheduled follow up.      Sebastián Moore DO    This document has been electronically signed by Sebastián Moore DO  June 20, 2025 09:30 EDT    Part of this note may be an electronic transcription/translation of spoken language to printed text using the Dragon Dictation System. Some of the data in this electronic note has been brought forward from a previous encounter, any necessary changes have been made, it has been reviewed by this provider, and it is accurate.    Answers submitted by the patient for this visit:  Problem not listed (Submitted on 6/20/2025)  Chief Complaint: Other medical problem  Reason for appointment: Medicine check